# Patient Record
Sex: FEMALE | Race: BLACK OR AFRICAN AMERICAN | NOT HISPANIC OR LATINO
[De-identification: names, ages, dates, MRNs, and addresses within clinical notes are randomized per-mention and may not be internally consistent; named-entity substitution may affect disease eponyms.]

---

## 2017-09-06 PROBLEM — Z00.00 ENCOUNTER FOR PREVENTIVE HEALTH EXAMINATION: Status: ACTIVE | Noted: 2017-09-06

## 2017-09-27 ENCOUNTER — APPOINTMENT (OUTPATIENT)
Dept: VASCULAR SURGERY | Facility: CLINIC | Age: 52
End: 2017-09-27
Payer: MEDICARE

## 2017-09-27 VITALS
DIASTOLIC BLOOD PRESSURE: 72 MMHG | HEIGHT: 67 IN | SYSTOLIC BLOOD PRESSURE: 123 MMHG | BODY MASS INDEX: 39.24 KG/M2 | HEART RATE: 64 BPM | WEIGHT: 250 LBS | OXYGEN SATURATION: 94 %

## 2017-09-27 DIAGNOSIS — Z86.79 PERSONAL HISTORY OF OTHER DISEASES OF THE CIRCULATORY SYSTEM: ICD-10-CM

## 2017-09-27 DIAGNOSIS — Z86.39 PERSONAL HISTORY OF OTHER ENDOCRINE, NUTRITIONAL AND METABOLIC DISEASE: ICD-10-CM

## 2017-09-27 DIAGNOSIS — Z87.448 PERSONAL HISTORY OF OTHER DISEASES OF URINARY SYSTEM: ICD-10-CM

## 2017-09-27 DIAGNOSIS — I77.9 DISORDER OF ARTERIES AND ARTERIOLES, UNSPECIFIED: ICD-10-CM

## 2017-09-27 DIAGNOSIS — E11.40 TYPE 2 DIABETES MELLITUS WITH DIABETIC NEUROPATHY, UNSPECIFIED: ICD-10-CM

## 2017-09-27 PROCEDURE — 93923 UPR/LXTR ART STDY 3+ LVLS: CPT

## 2017-09-27 PROCEDURE — 99204 OFFICE O/P NEW MOD 45 MIN: CPT | Mod: 25

## 2017-09-28 PROBLEM — I77.9 PERIPHERAL ARTERIAL OCCLUSIVE DISEASE: Status: ACTIVE | Noted: 2017-09-28

## 2017-09-28 PROBLEM — Z86.79 HISTORY OF ESSENTIAL HYPERTENSION: Status: RESOLVED | Noted: 2017-09-27 | Resolved: 2017-09-28

## 2017-09-28 PROBLEM — Z87.448 HISTORY OF KIDNEY DISEASE: Status: RESOLVED | Noted: 2017-09-27 | Resolved: 2017-09-28

## 2017-09-28 PROBLEM — Z86.39 HISTORY OF DIABETES MELLITUS: Status: RESOLVED | Noted: 2017-09-27 | Resolved: 2017-09-28

## 2017-09-28 PROBLEM — Z86.39 HISTORY OF HYPERLIPIDEMIA: Status: RESOLVED | Noted: 2017-09-27 | Resolved: 2017-09-28

## 2017-09-28 PROBLEM — E11.40 DIABETIC NEUROPATHY: Status: ACTIVE | Noted: 2017-09-28

## 2017-09-28 RX ORDER — CHOLECALCIFEROL (VITAMIN D3) 50 MCG
50 MCG TABLET ORAL
Refills: 0 | Status: ACTIVE | COMMUNITY

## 2017-09-28 RX ORDER — ASPIRIN 81 MG
81 TABLET,CHEWABLE ORAL
Refills: 0 | Status: ACTIVE | COMMUNITY

## 2017-09-28 RX ORDER — OXYCODONE 10 MG/1
10 TABLET ORAL
Refills: 0 | Status: ACTIVE | COMMUNITY

## 2017-09-28 RX ORDER — LOSARTAN POTASSIUM 50 MG/1
50 TABLET, FILM COATED ORAL
Refills: 0 | Status: ACTIVE | COMMUNITY

## 2017-09-28 RX ORDER — SPIRONOLACTONE 50 MG/1
50 TABLET ORAL
Refills: 0 | Status: ACTIVE | COMMUNITY

## 2017-09-28 RX ORDER — ATORVASTATIN CALCIUM 40 MG/1
40 TABLET, FILM COATED ORAL
Refills: 0 | Status: ACTIVE | COMMUNITY

## 2017-09-28 RX ORDER — METOPROLOL SUCCINATE 50 MG/1
50 TABLET, EXTENDED RELEASE ORAL
Refills: 0 | Status: ACTIVE | COMMUNITY

## 2017-09-28 RX ORDER — GABAPENTIN 400 MG/1
CAPSULE ORAL
Refills: 0 | Status: ACTIVE | COMMUNITY

## 2019-08-22 ENCOUNTER — OUTPATIENT (OUTPATIENT)
Dept: OUTPATIENT SERVICES | Facility: HOSPITAL | Age: 54
LOS: 1 days | Discharge: HOME | End: 2019-08-22

## 2019-08-22 ENCOUNTER — RESULT REVIEW (OUTPATIENT)
Age: 54
End: 2019-08-22

## 2019-08-22 LAB — GLUCOSE BLDC GLUCOMTR-MCNC: 161 MG/DL — HIGH (ref 70–99)

## 2019-08-28 LAB — SURGICAL PATHOLOGY STUDY: SIGNIFICANT CHANGE UP

## 2019-09-05 ENCOUNTER — APPOINTMENT (OUTPATIENT)
Dept: ORTHOPEDIC SURGERY | Facility: CLINIC | Age: 54
End: 2019-09-05
Payer: MEDICARE

## 2019-09-05 VITALS — WEIGHT: 250 LBS | HEIGHT: 67 IN | BODY MASS INDEX: 39.24 KG/M2

## 2019-09-05 DIAGNOSIS — M25.652 STIFFNESS OF RIGHT HIP, NOT ELSEWHERE CLASSIFIED: ICD-10-CM

## 2019-09-05 DIAGNOSIS — M25.651 STIFFNESS OF RIGHT HIP, NOT ELSEWHERE CLASSIFIED: ICD-10-CM

## 2019-09-05 PROCEDURE — 73521 X-RAY EXAM HIPS BI 2 VIEWS: CPT

## 2019-09-05 PROCEDURE — 99204 OFFICE O/P NEW MOD 45 MIN: CPT

## 2019-09-05 NOTE — PHYSICAL EXAM
[de-identified] : His exam today patient's full passive internal and external rotation of both hips with no pain. She has point tenderness in the area of the greater trochanter of both hips.\par \par She also has moderate discomfort with palpation of the lumbar segments paraspinal musculature. [de-identified] : Low AP pelvis and laterals of both hips were obtained showing well-positioned bilateral hip replacements. No obvious evidence of loosening subsidence fracture lysis or wear.

## 2019-09-05 NOTE — HISTORY OF PRESENT ILLNESS
[de-identified] : CHIARA HIP PAIN - SEEN LAST IN 2017 - WOULD LIKE TO DISCUSS SURGERYWas told by another physician that her left hip replacement needs a revision her main complaint is bilateral pain in her hips in the area of the greater trochanter.

## 2019-09-05 NOTE — DISCUSSION/SUMMARY
[de-identified] : Patient's exam and history were consistent with lumbar radiculopathy and stenosis. I will place her on a Medrol dose pack patient is aware she should check her glucose levels at home and adjusted insulin accordingly. She will followup in a week not thoroughly improved for further evaluation

## 2019-09-11 ENCOUNTER — APPOINTMENT (OUTPATIENT)
Dept: ORTHOPEDIC SURGERY | Facility: CLINIC | Age: 54
End: 2019-09-11

## 2019-10-01 ENCOUNTER — APPOINTMENT (OUTPATIENT)
Dept: ORTHOPEDIC SURGERY | Facility: CLINIC | Age: 54
End: 2019-10-01
Payer: MEDICARE

## 2019-10-01 DIAGNOSIS — M54.5 LOW BACK PAIN: ICD-10-CM

## 2019-10-01 PROCEDURE — 99213 OFFICE O/P EST LOW 20 MIN: CPT

## 2019-10-01 PROCEDURE — 72100 X-RAY EXAM L-S SPINE 2/3 VWS: CPT

## 2019-10-01 NOTE — ASSESSMENT
[FreeTextEntry1] : 53 year old female with chronic low back pain.  She recently had an epidural steroid injection which did not provide any relief.  We reviewed her images including her radiographs and MRI. She has lumbar spondylosis, disc disease and L4-L5, L5-S1 disc bulges which could explain her symptoms.  She has not tried physical therapy.  We will provide her with a physical therapy referral.  We also counseled her on weight loss which can also help with her low back pain.  Based on the exam and imaging her pain is not coming from her hips.  She will followup with Dr. Chan for evaluation for another injection which could provide us with diagnostic information as well as be therapeutic for the patient.  She can followup after she has seen Dr. Chan again.  She knows she can call with any questions or concerns or if her symptoms acutely worsen.

## 2019-10-01 NOTE — HISTORY OF PRESENT ILLNESS
[de-identified] : 53 year old female with chronic low back pain since 2009.  She recently had an epidural steroid injection by Dr. Chan which has not provided her any relief.  She describes the pain as being primarily in her low back above her belt line.  She says can sometimes feel it going into her legs but the leg pain isn't what bothers her.  It is the low back pain that limits her activity and causes her the discomfort.  She denies any groin pain.  She has peripheral neuropathy at baseline from her insulin dependent diabetes.  She denies any weakness, any urinary retention or balance problems.

## 2019-10-01 NOTE — HISTORY OF PRESENT ILLNESS
[de-identified] : 53 year old female with chronic low back pain since 2009.  She recently had an epidural steroid injection by Dr. Chan which has not provided her any relief.  She describes the pain as being primarily in her low back above her belt line.  She says can sometimes feel it going into her legs but the leg pain isn't what bothers her.  It is the low back pain that limits her activity and causes her the discomfort.  She denies any groin pain.  She has peripheral neuropathy at baseline from her insulin dependent diabetes.  She denies any weakness, any urinary retention or balance problems.

## 2019-10-01 NOTE — PHYSICAL EXAM
[de-identified] : Lumbar spine:\par Alignment normal.\par Tender to palpation in low back. No step-off, no deformity.\par No neurologic symptoms with active range of motion (flexion, extension, lateral bending and rotation).  Range of motion causes low back discomfort.  \par Negative straight leg raises bilterally\par \par Sensation \par Left L2  -  2/2            \par Left L3  -  1/2\par Left L4  -  1/2\par Left L5  -  1/2\par Left S1  -  1/2\par \par Right L2  -  2/2            \par Right L3  -  1/2\par Right L4  -  1/2\par Right L5  -  1/2\par Right S1  -  1/2\par *sensory exam consistent with patient's known peripheral diabetic neuropathy\par Motor: \par Left L2 (hip flexion)                            5/5                \par Left L3 (knee extension)                   5/5                \par Left L4 (ankle dosiflexion)                 5/5                \par Left L5 (long toe extensor)                5/5                \par Left S1 (ankle plantar flexion)           5/5\par \par Right L2 (hip flexion)                            5/5                \par Right L3 (knee extension)                   5/5                \par Right L4 (ankle dosiflexion)                 5/5                \par Right L5 (long toe extensor)                5/5                \par Right S1 (ankle plantar flexion)           5/5\par \par Reflexes: Normal and symmetric\par \par Negative clonus.  Down-going Babinski.  \par \par  [de-identified] : Lumbar radiographs show lumbar spondylosis and degenerative scoliosis. \par \par Lumbar MRI shows degenerative disc disease most significant at L4-L5, L5-S1 with disc bulging.

## 2019-10-01 NOTE — PHYSICAL EXAM
[de-identified] : Lumbar spine:\par Alignment normal.\par Tender to palpation in low back. No step-off, no deformity.\par No neurologic symptoms with active range of motion (flexion, extension, lateral bending and rotation).  Range of motion causes low back discomfort.  \par Negative straight leg raises bilterally\par \par Sensation \par Left L2  -  2/2            \par Left L3  -  1/2\par Left L4  -  1/2\par Left L5  -  1/2\par Left S1  -  1/2\par \par Right L2  -  2/2            \par Right L3  -  1/2\par Right L4  -  1/2\par Right L5  -  1/2\par Right S1  -  1/2\par *sensory exam consistent with patient's known peripheral diabetic neuropathy\par Motor: \par Left L2 (hip flexion)                            5/5                \par Left L3 (knee extension)                   5/5                \par Left L4 (ankle dosiflexion)                 5/5                \par Left L5 (long toe extensor)                5/5                \par Left S1 (ankle plantar flexion)           5/5\par \par Right L2 (hip flexion)                            5/5                \par Right L3 (knee extension)                   5/5                \par Right L4 (ankle dosiflexion)                 5/5                \par Right L5 (long toe extensor)                5/5                \par Right S1 (ankle plantar flexion)           5/5\par \par Reflexes: Normal and symmetric\par \par Negative clonus.  Down-going Babinski.  \par \par  [de-identified] : Lumbar radiographs show lumbar spondylosis and degenerative scoliosis. \par \par Lumbar MRI shows degenerative disc disease most significant at L4-L5, L5-S1 with disc bulging.

## 2019-10-11 ENCOUNTER — APPOINTMENT (OUTPATIENT)
Dept: COLORECTAL SURGERY | Facility: CLINIC | Age: 54
End: 2019-10-11
Payer: MEDICARE

## 2019-10-11 VITALS
WEIGHT: 251.13 LBS | BODY MASS INDEX: 39.42 KG/M2 | TEMPERATURE: 98.6 F | HEIGHT: 67 IN | DIASTOLIC BLOOD PRESSURE: 66 MMHG | SYSTOLIC BLOOD PRESSURE: 157 MMHG | HEART RATE: 64 BPM

## 2019-10-11 PROCEDURE — 99212 OFFICE O/P EST SF 10 MIN: CPT | Mod: 25

## 2019-10-11 PROCEDURE — 46600 DIAGNOSTIC ANOSCOPY SPX: CPT

## 2019-10-11 NOTE — PHYSICAL EXAM
[Normal rectal exam] : exam was normal [Excoriation] : no perianal excoriation [None] : no anal fissures seen [Fistula] : no fistulas [Multiple Sinus Tracts] : no perianal sinus tracts [Wart] : a wart [Ulcer ___ cm] : no ulcers [Pilonidal Cyst] : no pilonidal cysts [Tender, Swollen] : nontender, non-swollen [Thrombosed] : that was not thrombosed [Skin Tags] : there were no residual hemorrhoidal skin tags seen [Stool Sample Taken] : no stool obtained on rectal exam [Normal] : was normal [Gross Blood] : no gross blood [de-identified] : anoscopy"  3 warts seen at or above dentate line on the left side [de-identified] : external exam WNL

## 2019-10-11 NOTE — ASSESSMENT
[FreeTextEntry1] : 3 internal anal warts.  Advised EUA and fulguration and excision of warts\par Office not the best setting for this procedure.\par Risk of recurrence discussed

## 2019-10-11 NOTE — HISTORY OF PRESENT ILLNESS
[FreeTextEntry1] : Hx anal warts\par For interval exam\par \par S/P recent GYN cone biopsy/leap procedure in August 2019.  Doing ok now from that.\par \par

## 2019-11-04 VITALS
HEART RATE: 63 BPM | RESPIRATION RATE: 16 BRPM | OXYGEN SATURATION: 96 % | DIASTOLIC BLOOD PRESSURE: 71 MMHG | TEMPERATURE: 98 F | SYSTOLIC BLOOD PRESSURE: 147 MMHG | HEIGHT: 67 IN | WEIGHT: 249.12 LBS

## 2019-11-04 NOTE — ASU PATIENT PROFILE, ADULT - PMH
Anal warts    DM (diabetes mellitus)    HLD (hyperlipidemia)    HPV in female    HTN (hypertension) Anal warts    DM (diabetes mellitus)    HLD (hyperlipidemia)    HPV in female    HTN (hypertension)    Sarcoidosis  Lung and Liver Anal warts    DM (diabetes mellitus)    HLD (hyperlipidemia)    HPV in female    HTN (hypertension)    Liver cirrhosis    Sarcoidosis  Lung and Liver

## 2019-11-04 NOTE — ASU PATIENT PROFILE, ADULT - PRESSURE ULCER(S)
no
Started on IV potassium chloride 10mEq, potassium chloride tab 40 mEq  Monitor serum K+  Repeat BMP, Mg and Phos in progress

## 2019-11-04 NOTE — ASU PATIENT PROFILE, ADULT - CENTRAL VENOUS CATHETER
Patient updated and verbalized her understanding.     Lizzy will need to call May 1 to set-up a CT Urogram to monitor a left kidney mass.     no

## 2019-11-04 NOTE — ASU PATIENT PROFILE, ADULT - PSH
History of     History of cholecystectomy    History of foot surgery    History of hand surgery    History of hernia repair    History of hip replacement    History of shoulder surgery

## 2019-11-04 NOTE — ASU PREOP CHECKLIST - SELECT TESTS ORDERED
PT/PTT/Urinalysis/EKG/CBC/INR/Stress Test/CMP EKG/Urinalysis/Stress Test,echo/CMP/PT/PTT/CBC/INR PT/PTT/Urinalysis/Stress Test,echo, ucg - negative/CMP/INR/CBC/EKG

## 2019-11-05 ENCOUNTER — APPOINTMENT (OUTPATIENT)
Dept: COLORECTAL SURGERY | Facility: HOSPITAL | Age: 54
End: 2019-11-05

## 2019-11-12 ENCOUNTER — APPOINTMENT (OUTPATIENT)
Dept: COLORECTAL SURGERY | Facility: HOSPITAL | Age: 54
End: 2019-11-12

## 2019-11-12 ENCOUNTER — OUTPATIENT (OUTPATIENT)
Dept: OUTPATIENT SERVICES | Facility: HOSPITAL | Age: 54
LOS: 1 days | Discharge: HOME | End: 2019-11-12
Payer: MEDICARE

## 2019-11-12 ENCOUNTER — RESULT REVIEW (OUTPATIENT)
Age: 54
End: 2019-11-12

## 2019-11-12 VITALS
RESPIRATION RATE: 12 BRPM | OXYGEN SATURATION: 96 % | DIASTOLIC BLOOD PRESSURE: 79 MMHG | HEART RATE: 60 BPM | SYSTOLIC BLOOD PRESSURE: 161 MMHG

## 2019-11-12 DIAGNOSIS — Z96.649 PRESENCE OF UNSPECIFIED ARTIFICIAL HIP JOINT: Chronic | ICD-10-CM

## 2019-11-12 DIAGNOSIS — Z90.49 ACQUIRED ABSENCE OF OTHER SPECIFIED PARTS OF DIGESTIVE TRACT: Chronic | ICD-10-CM

## 2019-11-12 DIAGNOSIS — Z98.890 OTHER SPECIFIED POSTPROCEDURAL STATES: Chronic | ICD-10-CM

## 2019-11-12 DIAGNOSIS — Z98.891 HISTORY OF UTERINE SCAR FROM PREVIOUS SURGERY: Chronic | ICD-10-CM

## 2019-11-12 LAB
GLUCOSE BLDC GLUCOMTR-MCNC: 145 MG/DL — HIGH (ref 70–99)
GLUCOSE BLDC GLUCOMTR-MCNC: 159 MG/DL — HIGH (ref 70–99)

## 2019-11-12 PROCEDURE — 46922 EXCISION OF ANAL LESION(S): CPT

## 2019-11-12 PROCEDURE — 82962 GLUCOSE BLOOD TEST: CPT

## 2019-11-12 PROCEDURE — 88305 TISSUE EXAM BY PATHOLOGIST: CPT

## 2019-11-12 RX ORDER — KETOROLAC TROMETHAMINE 30 MG/ML
1 SYRINGE (ML) INJECTION
Qty: 12 | Refills: 0
Start: 2019-11-12 | End: 2019-11-14

## 2019-11-12 RX ORDER — ONDANSETRON 8 MG/1
4 TABLET, FILM COATED ORAL ONCE
Refills: 0 | Status: DISCONTINUED | OUTPATIENT
Start: 2019-11-12 | End: 2019-11-12

## 2019-11-12 RX ORDER — SODIUM CHLORIDE 9 MG/ML
1000 INJECTION, SOLUTION INTRAVENOUS
Refills: 0 | Status: DISCONTINUED | OUTPATIENT
Start: 2019-11-12 | End: 2019-11-12

## 2019-11-12 RX ORDER — BUPIVACAINE 13.3 MG/ML
20 INJECTION, SUSPENSION, LIPOSOMAL INFILTRATION ONCE
Refills: 0 | Status: DISCONTINUED | OUTPATIENT
Start: 2019-11-12 | End: 2019-11-12

## 2019-11-12 RX ORDER — KETOROLAC TROMETHAMINE 30 MG/ML
10 SYRINGE (ML) INJECTION ONCE
Refills: 0 | Status: DISCONTINUED | OUTPATIENT
Start: 2019-11-12 | End: 2019-11-12

## 2019-11-12 RX ADMIN — SODIUM CHLORIDE 60 MILLILITER(S): 9 INJECTION, SOLUTION INTRAVENOUS at 09:45

## 2019-11-12 NOTE — BRIEF OPERATIVE NOTE - NSICDXBRIEFPROCEDURE_GEN_ALL_CORE_FT
PROCEDURES:  Exam under anesthesia, anus 12-Nov-2019 09:28:47  Roxanne Mittal  Excision, anal condyloma 12-Nov-2019 09:28:35  Roxanne Mittal

## 2019-11-12 NOTE — PACU DISCHARGE NOTE - COMMENTS
Patient meets criteria for discharge to [facility]. VSS.Denies pain at this time. Operative site with no drainage at this time. All instructions reviewed and  of providers information given to patient and family. IV discontinued. Tolerated po diet. voided. Discharged to [facility] via wheelchair with PCA.

## 2019-11-12 NOTE — BRIEF OPERATIVE NOTE - OPERATION/FINDINGS
Exam revealed anal condylomas x2. Both excised and edges reapproximated with chromic sutures. Continued exam revealed no further lesions. Hemostasis achieved prior to end of procedure.

## 2019-11-13 LAB — SURGICAL PATHOLOGY STUDY: SIGNIFICANT CHANGE UP

## 2019-11-22 PROBLEM — A63.0 ANOGENITAL (VENEREAL) WARTS: Chronic | Status: ACTIVE | Noted: 2019-11-04

## 2019-11-22 PROBLEM — B97.7 PAPILLOMAVIRUS AS THE CAUSE OF DISEASES CLASSIFIED ELSEWHERE: Chronic | Status: ACTIVE | Noted: 2019-11-04

## 2019-11-22 PROBLEM — E78.5 HYPERLIPIDEMIA, UNSPECIFIED: Chronic | Status: ACTIVE | Noted: 2019-11-04

## 2019-11-22 PROBLEM — E11.9 TYPE 2 DIABETES MELLITUS WITHOUT COMPLICATIONS: Chronic | Status: ACTIVE | Noted: 2019-11-04

## 2019-11-22 PROBLEM — D86.9 SARCOIDOSIS, UNSPECIFIED: Chronic | Status: ACTIVE | Noted: 2019-11-12

## 2019-11-22 PROBLEM — K74.60 UNSPECIFIED CIRRHOSIS OF LIVER: Chronic | Status: ACTIVE | Noted: 2019-11-12

## 2019-11-22 PROBLEM — I10 ESSENTIAL (PRIMARY) HYPERTENSION: Chronic | Status: ACTIVE | Noted: 2019-11-04

## 2019-12-05 ENCOUNTER — APPOINTMENT (OUTPATIENT)
Dept: COLORECTAL SURGERY | Facility: CLINIC | Age: 54
End: 2019-12-05
Payer: MEDICARE

## 2019-12-05 VITALS
DIASTOLIC BLOOD PRESSURE: 65 MMHG | SYSTOLIC BLOOD PRESSURE: 149 MMHG | TEMPERATURE: 97.8 F | BODY MASS INDEX: 39.39 KG/M2 | HEART RATE: 69 BPM | WEIGHT: 251 LBS | HEIGHT: 67 IN

## 2019-12-05 PROCEDURE — 99212 OFFICE O/P EST SF 10 MIN: CPT

## 2019-12-05 NOTE — HISTORY OF PRESENT ILLNESS
[FreeTextEntry1] : Patient s/p exciaion of anal skin tag and anal condyloma on 11/12/2019.  No complaints, no pain, no bleeding.

## 2019-12-05 NOTE — PHYSICAL EXAM
[FreeTextEntry1] : PE: \par \par AOA, looks well\par external anal: no open wounds, healed, no warts. \par \par

## 2019-12-05 NOTE — ASSESSMENT
[FreeTextEntry1] : s/p excision of anal condyloma, \par Path reviewed: no dysplasia, \par follow up in 2 months.

## 2020-02-10 ENCOUNTER — APPOINTMENT (OUTPATIENT)
Dept: COLORECTAL SURGERY | Facility: CLINIC | Age: 55
End: 2020-02-10
Payer: MEDICARE

## 2020-02-10 VITALS
DIASTOLIC BLOOD PRESSURE: 69 MMHG | WEIGHT: 255 LBS | BODY MASS INDEX: 40.02 KG/M2 | HEIGHT: 67 IN | SYSTOLIC BLOOD PRESSURE: 164 MMHG | TEMPERATURE: 97.6 F | HEART RATE: 69 BPM

## 2020-02-10 PROCEDURE — 46600 DIAGNOSTIC ANOSCOPY SPX: CPT

## 2020-02-10 PROCEDURE — 99214 OFFICE O/P EST MOD 30 MIN: CPT | Mod: 25

## 2020-02-10 NOTE — ASSESSMENT
[FreeTextEntry1] : Two internal lesions seen (both mucosal). Not amenable to acid treatment.\par Advise EUA and excision and fulguration. \par Needs medical clearance \par Ambulatory procedure.\par Can continue her ASA daily (not on blood thinner, or other anticoagulants)

## 2020-02-10 NOTE — HISTORY OF PRESENT ILLNESS
[FreeTextEntry1] : Hx anal warts and AIN.\par Hx of cirrhosis related to her sarcoidosis.\par No anorectal symptoms.\par Overall she has been stable.

## 2020-02-10 NOTE — PHYSICAL EXAM
[Abdomen Masses] : No abdominal masses [None] : no anal fissures seen [Excoriation] : no perianal excoriation [Abdomen Tenderness] : ~T No ~M abdominal tenderness [Fistula] : no fistulas [Multiple Sinus Tracts] : no perianal sinus tracts [Wart] : a wart [Tender, Swollen] : nontender, non-swollen [Pilonidal Cyst] : no pilonidal cysts [Nonprolapsing] : a nonprolapsing (grade I) [Gross Blood] : no gross blood [Normal] : was normal [JVD] : no jugular venous distention  [Carotid Bruits] : no carotid bruits [Thyroid] : the thyroid was abnormal [Normal Breath Sounds] : Normal breath sounds [Normal Heart Sounds] : normal heart sounds [2+] : left 2+ [No Rash or Lesion] : No rash or lesion [Alert] : alert [Oriented to Person] : oriented to person [Oriented to Place] : oriented to place [Oriented to Time] : oriented to time [Calm] : calm [de-identified] : obese, healed scars, no masses, no tenderness [de-identified] : external: no lesions seen, has skin tags [de-identified] : internaol warts on mucosa: 1 posterior and 1 anterior. fairly clear [de-identified] : digital: tone poor, no lesions palpated, empty vault [de-identified] : NAD

## 2020-03-27 ENCOUNTER — APPOINTMENT (OUTPATIENT)
Dept: ORTHOPEDIC SURGERY | Facility: CLINIC | Age: 55
End: 2020-03-27

## 2020-04-02 ENCOUNTER — APPOINTMENT (OUTPATIENT)
Dept: COLORECTAL SURGERY | Facility: HOSPITAL | Age: 55
End: 2020-04-02

## 2020-04-15 ENCOUNTER — APPOINTMENT (OUTPATIENT)
Dept: PHYSICAL MEDICINE AND REHAB | Facility: CLINIC | Age: 55
End: 2020-04-15

## 2020-07-13 ENCOUNTER — APPOINTMENT (OUTPATIENT)
Dept: ORTHOPEDIC SURGERY | Facility: CLINIC | Age: 55
End: 2020-07-13
Payer: MEDICARE

## 2020-07-13 PROCEDURE — 99214 OFFICE O/P EST MOD 30 MIN: CPT

## 2020-07-13 PROCEDURE — 73521 X-RAY EXAM HIPS BI 2 VIEWS: CPT

## 2020-07-13 PROCEDURE — 73562 X-RAY EXAM OF KNEE 3: CPT | Mod: 50

## 2020-07-13 NOTE — REASON FOR VISIT
[Follow-Up Visit] : a follow-up visit for [FreeTextEntry2] : CHIARA KNEE AND CHIARA HIP PAIN - SENT FOR XRAYS OF BOTH AREAS

## 2020-07-13 NOTE — DISCUSSION/SUMMARY
[de-identified] : We discussed possible cortisone injection into the right knee and she deferred today because she is having hand surgery in 2 days and does not lock or surgery patient return next Monday if still having persistent pain to the injection.

## 2020-07-13 NOTE — PHYSICAL EXAM
[de-identified] : Patient's right knee has minimal warmth there is definite mild medial joint line tenderness positive Ana sign. As far as the hips are concerned patient has both hips exhibit full passive internal/external rotation without restriction. [de-identified] : AP standing of the laterals as well as sunrise views of both obtained showing modest diminishment of medial joint space on standing AP projection of both knees. AP lateral both hips were obtained showing acceptable position of bilateral hips. No evidence of subsidence loosening or wear her prior radiographs.

## 2020-07-13 NOTE — HISTORY OF PRESENT ILLNESS
[de-identified] : Patient share status post bilateral hip replacements for surveillance check and also the plate of right knee pain. Patient states that she has difficulty with stair climbing getting in a seated position pain is medially based. She is admitted with a cane due to discomfort.

## 2020-07-30 ENCOUNTER — APPOINTMENT (OUTPATIENT)
Dept: ORTHOPEDIC SURGERY | Facility: CLINIC | Age: 55
End: 2020-07-30

## 2020-08-11 ENCOUNTER — LABORATORY RESULT (OUTPATIENT)
Age: 55
End: 2020-08-11

## 2020-08-12 ENCOUNTER — TRANSCRIPTION ENCOUNTER (OUTPATIENT)
Age: 55
End: 2020-08-12

## 2020-08-13 ENCOUNTER — OUTPATIENT (OUTPATIENT)
Dept: OUTPATIENT SERVICES | Facility: HOSPITAL | Age: 55
LOS: 1 days | Discharge: HOME | End: 2020-08-13
Payer: MEDICARE

## 2020-08-13 ENCOUNTER — APPOINTMENT (OUTPATIENT)
Dept: COLORECTAL SURGERY | Facility: AMBULATORY SURGERY CENTER | Age: 55
End: 2020-08-13

## 2020-08-13 DIAGNOSIS — Z90.49 ACQUIRED ABSENCE OF OTHER SPECIFIED PARTS OF DIGESTIVE TRACT: Chronic | ICD-10-CM

## 2020-08-13 DIAGNOSIS — Z98.890 OTHER SPECIFIED POSTPROCEDURAL STATES: Chronic | ICD-10-CM

## 2020-08-13 DIAGNOSIS — Z98.891 HISTORY OF UTERINE SCAR FROM PREVIOUS SURGERY: Chronic | ICD-10-CM

## 2020-08-13 DIAGNOSIS — Z96.649 PRESENCE OF UNSPECIFIED ARTIFICIAL HIP JOINT: Chronic | ICD-10-CM

## 2020-08-13 LAB
GLUCOSE BLDC GLUCOMTR-MCNC: 182 MG/DL — HIGH (ref 70–99)
GLUCOSE BLDC GLUCOMTR-MCNC: 186 MG/DL — HIGH (ref 70–99)

## 2020-08-13 PROCEDURE — 46910 DESTRUCTION ANAL LESION(S): CPT

## 2020-08-13 PROCEDURE — 46922 EXCISION OF ANAL LESION(S): CPT

## 2020-08-13 RX ORDER — OXYCODONE HYDROCHLORIDE 5 MG/1
1 TABLET ORAL
Qty: 20 | Refills: 0
Start: 2020-08-13

## 2020-08-13 RX ORDER — ACETAMINOPHEN 500 MG
1 TABLET ORAL
Qty: 40 | Refills: 0
Start: 2020-08-13 | End: 2020-08-19

## 2020-08-13 RX ORDER — DOCUSATE SODIUM 100 MG
1 CAPSULE ORAL
Qty: 15 | Refills: 0
Start: 2020-08-13 | End: 2020-08-27

## 2020-08-13 RX ORDER — POLYETHYLENE GLYCOL 3350 17 G/17G
1 POWDER, FOR SOLUTION ORAL
Qty: 15 | Refills: 0
Start: 2020-08-13 | End: 2020-08-27

## 2020-08-18 ENCOUNTER — RESULT REVIEW (OUTPATIENT)
Age: 55
End: 2020-08-18

## 2020-08-18 PROCEDURE — 88305 TISSUE EXAM BY PATHOLOGIST: CPT | Mod: 26

## 2020-08-19 LAB — SURGICAL PATHOLOGY STUDY: SIGNIFICANT CHANGE UP

## 2020-08-24 ENCOUNTER — APPOINTMENT (OUTPATIENT)
Dept: COLORECTAL SURGERY | Facility: CLINIC | Age: 55
End: 2020-08-24
Payer: MEDICARE

## 2020-08-24 VITALS
DIASTOLIC BLOOD PRESSURE: 65 MMHG | WEIGHT: 245.63 LBS | TEMPERATURE: 97.2 F | HEART RATE: 54 BPM | SYSTOLIC BLOOD PRESSURE: 108 MMHG | HEIGHT: 67 IN | BODY MASS INDEX: 38.55 KG/M2 | OXYGEN SATURATION: 95 %

## 2020-08-24 PROCEDURE — 99213 OFFICE O/P EST LOW 20 MIN: CPT

## 2020-08-24 NOTE — HISTORY OF PRESENT ILLNESS
[FreeTextEntry1] : Patient with history anal condyloma is s/p re excision on August 13, 2020. \par Doing well, no complaints of pain,   just "burning" which is getting better. \par no bleeding. she is having bowel movements  and does sitz baths daily and after bms.  she is voiding normally.   She wants to know how to prevent them from coming back.

## 2020-08-24 NOTE — ASSESSMENT
[FreeTextEntry1] : S./p excision of anal condyloma.    may use  topical such as A & D ointment or desitin for comfort. \par discussed that there is no "cure for condyloma to stop them from coming back .  we discussed  some homeopathic alternatives, which may help, but won't hurt.  Gave her information regarding Thuja occidentalis, 6 C or 30C to take twice daily until her next visit.

## 2020-08-24 NOTE — PHYSICAL EXAM
[FreeTextEntry1] : PE: \par \par AOA, looks well \par abdomen soft, non tender,\par external anal:  2cm open partial thickness would posteriorly,  smaller partial thickness wound laterally. \par both are clean and granulating.

## 2020-12-11 ENCOUNTER — APPOINTMENT (OUTPATIENT)
Dept: COLORECTAL SURGERY | Facility: CLINIC | Age: 55
End: 2020-12-11
Payer: MEDICARE

## 2020-12-11 VITALS
SYSTOLIC BLOOD PRESSURE: 149 MMHG | DIASTOLIC BLOOD PRESSURE: 75 MMHG | TEMPERATURE: 97.2 F | HEIGHT: 67 IN | BODY MASS INDEX: 38.85 KG/M2 | WEIGHT: 247.56 LBS | HEART RATE: 60 BPM | OXYGEN SATURATION: 96 %

## 2020-12-11 PROCEDURE — 99072 ADDL SUPL MATRL&STAF TM PHE: CPT

## 2020-12-11 PROCEDURE — 46600 DIAGNOSTIC ANOSCOPY SPX: CPT

## 2020-12-11 PROCEDURE — 99212 OFFICE O/P EST SF 10 MIN: CPT | Mod: 25

## 2020-12-11 NOTE — PHYSICAL EXAM
[Nonprolapsing] : a nonprolapsing (grade I) [Normal] : was normal [None] : there was no rectal mass  [Tender, Swollen] : nontender, non-swollen [Thrombosed] : that was not thrombosed [Skin Tags] : there were no residual hemorrhoidal skin tags seen [Stool Sample Taken] : no stool obtained on rectal exam [Gross Blood] : no gross blood [de-identified] : external: WNL, no lesions seen no fistula, fissure [de-identified] : digital: tone WNL, no masses, no impaction [de-identified] : anoscopy: 3 passes, adult scope, no lesions seen, grade 1 hemorrhoids, no fissure

## 2021-07-19 ENCOUNTER — APPOINTMENT (OUTPATIENT)
Dept: COLORECTAL SURGERY | Facility: CLINIC | Age: 56
End: 2021-07-19
Payer: MEDICARE

## 2021-07-19 VITALS
DIASTOLIC BLOOD PRESSURE: 65 MMHG | OXYGEN SATURATION: 98 % | HEIGHT: 67 IN | TEMPERATURE: 97.6 F | SYSTOLIC BLOOD PRESSURE: 129 MMHG | BODY MASS INDEX: 37.39 KG/M2 | HEART RATE: 55 BPM | WEIGHT: 238.25 LBS

## 2021-07-19 VITALS — TEMPERATURE: 97.4 F

## 2021-07-19 PROCEDURE — 46600 DIAGNOSTIC ANOSCOPY SPX: CPT

## 2021-07-19 PROCEDURE — 99072 ADDL SUPL MATRL&STAF TM PHE: CPT

## 2021-07-19 NOTE — HISTORY OF PRESENT ILLNESS
[FreeTextEntry1] : No rectal symptoms.\par No bleeding.  No pain.\par \par \par Had LE vascular issue that required angioplasty by Dr. Jesus Dickerson recently\par Had breast lesion excision that was complicated by abscess that was drained and treated as inpatient. \par \par \par

## 2021-07-19 NOTE — PHYSICAL EXAM
[Skin Tags] : residual hemorrhoidal skin tags were noted [None] : there was no rectal mass  [Normal Breath Sounds] : Normal breath sounds [Normal Heart Sounds] : normal heart sounds [2+] : left 2+ [No Rash or Lesion] : No rash or lesion [Alert] : alert [Oriented to Person] : oriented to person [Oriented to Place] : oriented to place [Oriented to Time] : oriented to time [Calm] : calm [Excoriation] : no perianal excoriation [Fistula] : no fistulas [Wart] : no warts [Ulcer ___ cm] : no ulcers [Tender, Swollen] : nontender, non-swollen [Thrombosed] : that was not thrombosed [Stool Sample Taken] : no stool obtained on rectal exam [Gross Blood] : no gross blood [JVD] : no jugular venous distention  [Thyroid] : the thyroid was abnormal [Carotid Bruits] : no carotid bruits [de-identified] : ext: no lesions seen.  Digital: no masses, no lesions palpated.  Anoscopy: 3-4 passes with adult scope: no warts seen, has some RMP and hemorrhoids grade 1-2.  otherwise wnl [de-identified] : NAD

## 2022-02-07 ENCOUNTER — APPOINTMENT (OUTPATIENT)
Dept: COLORECTAL SURGERY | Facility: CLINIC | Age: 57
End: 2022-02-07

## 2022-02-11 ENCOUNTER — APPOINTMENT (OUTPATIENT)
Dept: COLORECTAL SURGERY | Facility: CLINIC | Age: 57
End: 2022-02-11
Payer: COMMERCIAL

## 2022-02-11 VITALS
BODY MASS INDEX: 37.35 KG/M2 | OXYGEN SATURATION: 97 % | TEMPERATURE: 96.2 F | DIASTOLIC BLOOD PRESSURE: 56 MMHG | SYSTOLIC BLOOD PRESSURE: 122 MMHG | WEIGHT: 238 LBS | HEIGHT: 67 IN | HEART RATE: 68 BPM

## 2022-02-11 PROCEDURE — 46600 DIAGNOSTIC ANOSCOPY SPX: CPT

## 2022-02-11 RX ORDER — APIXABAN 5 MG/1
5 TABLET, FILM COATED ORAL
Refills: 0 | Status: ACTIVE | COMMUNITY

## 2022-02-11 RX ORDER — INSULIN HUMAN 100 [IU]/ML
INJECTION, SUSPENSION SUBCUTANEOUS
Refills: 0 | Status: COMPLETED | COMMUNITY
End: 2022-02-11

## 2022-02-11 RX ORDER — IBUPROFEN 800 MG
TABLET ORAL
Refills: 0 | Status: COMPLETED | COMMUNITY
End: 2022-02-11

## 2022-02-11 RX ORDER — METHYLPREDNISOLONE 4 MG/1
4 TABLET ORAL
Qty: 1 | Refills: 1 | Status: COMPLETED | COMMUNITY
Start: 2019-09-05 | End: 2022-02-11

## 2022-02-11 RX ORDER — CALCIUM CARBONATE/VITAMIN D3 600 MG-10
TABLET ORAL
Refills: 0 | Status: ACTIVE | COMMUNITY

## 2022-02-11 RX ORDER — INSULIN DEGLUDEC INJECTION 100 U/ML
INJECTION, SOLUTION SUBCUTANEOUS
Refills: 0 | Status: ACTIVE | COMMUNITY

## 2022-02-11 RX ORDER — INSULIN HUMAN 500 [IU]/ML
500 INJECTION, SOLUTION SUBCUTANEOUS
Refills: 0 | Status: ACTIVE | COMMUNITY

## 2022-02-11 NOTE — HISTORY OF PRESENT ILLNESS
[FreeTextEntry1] : 56 year old female with history of anal condyloma.  \par REcently hospitalized in [facility] for left DVT . \par \par No rectal pain, occasional bleeding when wipes.

## 2022-02-11 NOTE — PHYSICAL EXAM
[Excoriation] : no perianal excoriation [Fistula] : no fistulas [Wart] : no warts [Ulcer ___ cm] : no ulcers [Tender, Swollen] : nontender, non-swollen [Thrombosed] : that was not thrombosed [Skin Tags] : residual hemorrhoidal skin tags were noted [Normal] : was normal [None] : there was no rectal mass  [Stool Sample Taken] : no stool obtained on rectal exam [Gross Blood] : no gross blood [de-identified] : ext: no lesions.  digital: no lesions, tone WNL:   anoscopy: adult, three passes. no lesions seen

## 2022-02-11 NOTE — REVIEW OF SYSTEMS
[Loss Of Hearing] : hearing loss [Negative] : Heme/Lymph [Fever] : no fever [Chills] : no chills [Feeling Poorly] : not feeling poorly [Feeling Tired] : not feeling tired [Recent Weight Gain (___ Lbs)] : no recent weight gain [Recent Weight Loss (___ Lbs)] : no recent weight loss [Earache] : no earache [Nosebleeds] : no nosebleeds [Nasal Discharge] : no nasal discharge [Heart Rate Is Slow] : the heart rate was not slow [Heart Rate Is Fast] : the heart rate was not fast [Chest Pain] : no chest pain [Palpitations] : no palpitations [Leg Claudication] : no intermittent leg claudication [Shortness Of Breath] : no shortness of breath [Wheezing] : no wheezing [Cough] : no cough [SOB on Exertion] : no shortness of breath during exertion [Orthopnea] : no orthopnea [Skin Lesions] : no skin lesions [Skin Wound] : no skin wound [Itching] : no itching [Change In A Mole] : no change in a mole [Breast Pain] : no breast pain [Breast Lump] : no breast lump [Confused] : no confusion [Convulsions] : no convulsions [Dizziness] : no dizziness [Limb Weakness] : no limb weakness [Difficulty Walking] : no difficulty walking [de-identified] : + hx DVT on eliquis now

## 2022-02-11 NOTE — ASSESSMENT
[FreeTextEntry1] : Clean exam (has some hemorrhoids but not inflamed now or bleeding).\par No warts.\par On eliquis.\par RTC 6 months.\par OK from our point of view\par

## 2022-09-12 ENCOUNTER — APPOINTMENT (OUTPATIENT)
Dept: COLORECTAL SURGERY | Facility: CLINIC | Age: 57
End: 2022-09-12

## 2022-09-12 VITALS
WEIGHT: 230 LBS | SYSTOLIC BLOOD PRESSURE: 118 MMHG | RESPIRATION RATE: 16 BRPM | HEIGHT: 67 IN | OXYGEN SATURATION: 98 % | TEMPERATURE: 98.5 F | HEART RATE: 60 BPM | DIASTOLIC BLOOD PRESSURE: 62 MMHG | BODY MASS INDEX: 36.1 KG/M2

## 2022-09-12 PROCEDURE — 99212 OFFICE O/P EST SF 10 MIN: CPT

## 2022-09-12 NOTE — PHYSICAL EXAM
[Normal] : was normal [None] : there was no rectal mass  [Abdomen Masses] : No abdominal masses [Abdomen Tenderness] : ~T No ~M abdominal tenderness [Excoriation] : no perianal excoriation [Fistula] : no fistulas [Wart] : no warts [Ulcer ___ cm] : no ulcers [Tender, Swollen] : nontender, non-swollen [Thrombosed] : that was not thrombosed [Skin Tags] : there were no residual hemorrhoidal skin tags seen [de-identified] : no groin nodes or masses,  abdomen benign, numerous scars [de-identified] : ext: some scarring but no signs of warts or lesions.  digital: no lesions noted, tone WNL.  Anoscopy, 4 passes, adult scope, scarring at the dentate line but no warts seen

## 2022-09-12 NOTE — HISTORY OF PRESENT ILLNESS
[FreeTextEntry1] : Condylomata follow up.\par No symptoms.\par No bleeding or pain.\par \par Last colonoscopy was 2017.  Had hyperplastic polyps then.\par Has  Mom and an Aunt with colon cancer. She is high risk. \par \par On eliquis for DVT\par Had peripheral vascular stents placed at [facility] and Dr. Lawernce Lee.\par \par

## 2022-09-12 NOTE — ASSESSMENT
[FreeTextEntry1] : Negative exam for warts or suspicous lesions today.\par RTC in 4 months for next exam, following R Anterior dentate line region\par \par Should get colonoscopy since she has + fam hx and it has been 5 years.\par Referring back to Dr. AISSATOU Hamilton. \par \par PMD and vascular f/u as well.

## 2023-02-06 ENCOUNTER — APPOINTMENT (OUTPATIENT)
Dept: COLORECTAL SURGERY | Facility: CLINIC | Age: 58
End: 2023-02-06
Payer: MEDICARE

## 2023-02-06 VITALS
SYSTOLIC BLOOD PRESSURE: 103 MMHG | TEMPERATURE: 98.06 F | HEART RATE: 72 BPM | RESPIRATION RATE: 18 BRPM | HEIGHT: 67 IN | OXYGEN SATURATION: 98 % | DIASTOLIC BLOOD PRESSURE: 62 MMHG | BODY MASS INDEX: 37.69 KG/M2 | WEIGHT: 240.13 LBS

## 2023-02-06 PROCEDURE — 99212 OFFICE O/P EST SF 10 MIN: CPT

## 2023-02-06 NOTE — ASSESSMENT
[FreeTextEntry1] : No lesions seen.  Function is regular.   Rare minor BRBPR.\par To get colonoscopy soon by Dr. Hamilton.\par Had recent EGD done.\par F/u 6 months

## 2023-02-06 NOTE — HISTORY OF PRESENT ILLNESS
[FreeTextEntry1] : 58 y/o F with Hx. of Anal condyloma here for routine f/u. Denies itchiness or irritation in perianal area.  Bowel movements are every 3-4 days and soft to hard x 6 months. Needs to strain sometimes. Rarely has blood on tissue. Denies pain with bowel movements. Denies abdominal pain, nausea or vomiting. \par On Eliquis for DVT in LLE since 6/2022. follows up with Dr. Lawrence Lopez at [facility]. \par \par Colonoscopy scheduled with Dr. Hamilton on 2/28/23.

## 2023-02-06 NOTE — REVIEW OF SYSTEMS
[Recent Weight Gain (___ Lbs)] : recent [unfilled] ~Ulb weight gain [Limb Pain] : limb pain [Easy Bleeding] : a tendency for easy bleeding [Easy Bruising] : a tendency for easy bruising [Negative] : Endocrine [Fever] : no fever [Chills] : no chills [Feeling Poorly] : not feeling poorly [Feeling Tired] : not feeling tired [Recent Weight Loss (___ Lbs)] : no recent weight loss [Eye Pain] : no eye pain [Red Eyes] : eyes not red [Eyesight Problems] : no eyesight problems [Discharge From Eyes] : no purulent discharge from the eyes [Dry Eyes] : no dryness of the eyes [Eyes Itch] : no itching of the eyes [Earache] : no earache [Loss Of Hearing] : no hearing loss [Nosebleeds] : no nosebleeds [Nasal Discharge] : no nasal discharge [Sore Throat] : no sore throat [Hoarseness] : no hoarseness [Heart Rate Is Slow] : the heart rate was not slow [Heart Rate Is Fast] : the heart rate was not fast [Chest Pain] : no chest pain [Palpitations] : no palpitations [Leg Claudication] : no intermittent leg claudication [Lower Ext Edema] : no lower extremity edema [Shortness Of Breath] : no shortness of breath [Wheezing] : no wheezing [Cough] : no cough [SOB on Exertion] : no shortness of breath during exertion [Orthopnea] : no orthopnea [PND] : no PND [Abdominal Pain] : no abdominal pain [Vomiting] : no vomiting [Constipation] : no constipation [Diarrhea] : no diarrhea [Heartburn] : no heartburn [Melena] : no melena [Dysuria] : no dysuria [Incontinence] : no incontinence [Pelvic Pain] : no pelvic pain [Dysmenorrhea] : no dysmenorrhea [Vaginal Discharge] : no vaginal discharge [Abn Vaginal Bleeding] : no unexplained vaginal bleeding [Arthralgias] : no arthralgias [Joint Swelling] : no joint swelling [Joint Stiffness] : no joint stiffness [Limb Swelling] : no limb swelling [Confused] : no confusion [Convulsions] : no convulsions [Dizziness] : no dizziness [Fainting] : no fainting [Suicidal] : not suicidal [Anxiety] : no anxiety [Depression] : no depression [FreeTextEntry2] : Gained 10 lbs in 6 months [FreeTextEntry8] : Post menopausal [FreeTextEntry9] : Diabetic neuropathy. Uses cane to walk.  [de-identified] : On Eliquis and baby aspirin

## 2023-02-06 NOTE — PHYSICAL EXAM
[Normal Breath Sounds] : Normal breath sounds [Normal Heart Sounds] : normal heart sounds [Normal Rate and Rhythm] : normal rate and rhythm [2+] : left 2+ [1+] : left 1+ [Alert] : alert [Oriented to Person] : oriented to person [Oriented to Place] : oriented to place [Oriented to Time] : oriented to time [Calm] : calm [Nonprolapsing] : a nonprolapsing (grade I) [Normal] : was normal [None] : there was no rectal mass  [Abdomen Masses] : No abdominal masses [Abdomen Tenderness] : ~T No ~M abdominal tenderness [Excoriation] : no perianal excoriation [Fistula] : no fistulas [Wart] : no warts [Ulcer ___ cm] : no ulcers [Tender, Swollen] : nontender, non-swollen [Thrombosed] : that was not thrombosed [Skin Tags] : there were no residual hemorrhoidal skin tags seen [Stool Sample Taken] : no stool obtained on rectal exam [Gross Blood] : no gross blood [JVD] : no jugular venous distention  [Carotid Bruits] : no carotid bruits [Wheezing] : no wheezing was heard [Right Carotid Bruit] : no bruit heard over the right carotid [Left Carotid Bruit] : no bruit heard over the left carotid [de-identified] : Obese with old scars [de-identified] : ext: no lesions seen.  No large skin tags.  Digital: no masses, WNL tone, no stenosis.  anoscopy: 3 passes, adult scope, no lesions seen. some scarring [de-identified] : NAD

## 2023-05-22 ENCOUNTER — APPOINTMENT (OUTPATIENT)
Dept: ORTHOPEDIC SURGERY | Facility: CLINIC | Age: 58
End: 2023-05-22
Payer: MEDICARE

## 2023-05-22 PROCEDURE — 99214 OFFICE O/P EST MOD 30 MIN: CPT

## 2023-05-22 RX ORDER — METHYLPREDNISOLONE 4 MG/1
4 TABLET ORAL
Qty: 1 | Refills: 3 | Status: ACTIVE | COMMUNITY
Start: 2023-05-22 | End: 1900-01-01

## 2023-05-22 NOTE — HISTORY OF PRESENT ILLNESS
[de-identified] : This is a well-known patient she is status post right hip replacement she is here with complaint of right knee pain.  Patient that she has difficulty going up and down stairs pain is ongoing for about 2 weeks recalls no specific accident or injury.  Patient is here for evaluation of her recent right knee pain.

## 2023-05-22 NOTE — DISCUSSION/SUMMARY
[Medication Risks Reviewed] : Medication risks reviewed [de-identified] : Patient I discussed the underlying possible etiology of her right knee pain.  She has no radiographic evidence of arthritis I believe that her knee pain will be self-limiting however to help improve the rate of her recovery patient will be placed on a Medrol Dosepak.  The reasonable risk benefits of medication were discussed in detail including potential side effects.  Reviewed her current medication profile there appears to be no contraindication to its use.\par \par Patient will follow-up in a week if not thoroughly improved for further evaluation possible MRI.\par \par This consultation lasted 35 minutes

## 2023-05-22 NOTE — PHYSICAL EXAM
[de-identified] : Right knee on exam today range of motion 0 to 130 degrees knee stable to varus varus valgus stress in both full extension and 90 degrees flexion.  The knee has a fairly benign appearance today there is no warmth minimal swelling no effusion noted.  Quad tone is good she is neurovascular intact distally. [de-identified] : Knee radiographs were ordered today AP standing individual and sunrise views were obtained showing well-preserved joint space in all 3 compartments of the knee no evidence of acute trauma or injury.

## 2023-09-18 ENCOUNTER — APPOINTMENT (OUTPATIENT)
Dept: COLORECTAL SURGERY | Facility: CLINIC | Age: 58
End: 2023-09-18
Payer: MEDICARE

## 2023-09-18 VITALS
HEART RATE: 51 BPM | WEIGHT: 233 LBS | RESPIRATION RATE: 16 BRPM | OXYGEN SATURATION: 99 % | HEIGHT: 67 IN | TEMPERATURE: 97.7 F | SYSTOLIC BLOOD PRESSURE: 154 MMHG | DIASTOLIC BLOOD PRESSURE: 79 MMHG | BODY MASS INDEX: 36.57 KG/M2

## 2023-09-18 DIAGNOSIS — Z80.6 FAMILY HISTORY OF LEUKEMIA: ICD-10-CM

## 2023-09-18 DIAGNOSIS — A63.0 ANOGENITAL (VENEREAL) WARTS: ICD-10-CM

## 2023-09-18 DIAGNOSIS — Z80.3 FAMILY HISTORY OF MALIGNANT NEOPLASM OF BREAST: ICD-10-CM

## 2023-09-18 DIAGNOSIS — Z80.0 FAMILY HISTORY OF MALIGNANT NEOPLASM OF DIGESTIVE ORGANS: ICD-10-CM

## 2023-09-18 PROCEDURE — 99212 OFFICE O/P EST SF 10 MIN: CPT

## 2024-03-18 ENCOUNTER — APPOINTMENT (OUTPATIENT)
Dept: COLORECTAL SURGERY | Facility: CLINIC | Age: 59
End: 2024-03-18
Payer: MEDICARE

## 2024-03-18 VITALS
SYSTOLIC BLOOD PRESSURE: 111 MMHG | TEMPERATURE: 98.6 F | BODY MASS INDEX: 36.88 KG/M2 | HEIGHT: 67 IN | OXYGEN SATURATION: 96 % | DIASTOLIC BLOOD PRESSURE: 61 MMHG | HEART RATE: 61 BPM | WEIGHT: 235 LBS

## 2024-03-18 PROCEDURE — 46600 DIAGNOSTIC ANOSCOPY SPX: CPT

## 2024-03-18 NOTE — PHYSICAL EXAM
[Fistula] : no fistulas [Wart] : no warts [Ulcer ___ cm] : no ulcers [Nonprolapsing] : a nonprolapsing (grade I) [Tender, Swollen] : nontender, non-swollen [Thrombosed] : that was not thrombosed [Skin Tags] : there were no residual hemorrhoidal skin tags seen [Normal] : was normal [Stool Sample Taken] : no stool obtained on rectal exam [None] : there was no rectal mass  [Gross Blood] : no gross blood [de-identified] : ext: no lesions.  Digital: no lesions, WNL tone, no stenosis.  anoscopy: no lesions seen, 3 passes

## 2024-03-18 NOTE — ASSESSMENT
[FreeTextEntry1] : No lesions seen, No symptoms. Will increase to q 1 year office visits and anoscopy. (patient requests and I think that is reasonable). If patient notes sx's to call sooner. Has had colonoscopy not too long ago.  No need for another.

## 2024-03-18 NOTE — REVIEW OF SYSTEMS
[Arthralgias] : arthralgias [Limb Pain] : limb pain [Negative] : Psychiatric [Fever] : no fever [Chills] : no chills [Feeling Poorly] : not feeling poorly [Feeling Tired] : not feeling tired [Recent Weight Gain (___ Lbs)] : no recent weight gain [Recent Weight Loss (___ Lbs)] : no recent weight loss [Nosebleeds] : no nosebleeds [Sore Throat] : no sore throat [Nasal Discharge] : no nasal discharge [Chest Pain] : no chest pain [Hoarseness] : no hoarseness [Palpitations] : no palpitations [Shortness Of Breath] : no shortness of breath [Wheezing] : no wheezing [Cough] : no cough [SOB on Exertion] : no shortness of breath during exertion [Abdominal Pain] : no abdominal pain [Vomiting] : no vomiting [Diarrhea] : no diarrhea [Constipation] : no constipation [Dysuria] : no dysuria [Incontinence] : no incontinence [Pelvic Pain] : no pelvic pain [Dysmenorrhea] : no dysmenorrhea [Confused] : no confusion [Convulsions] : no convulsions [Dizziness] : no dizziness [Fainting] : no fainting [Suicidal] : not suicidal [Sleep Disturbances] : no sleep disturbances [Anxiety] : no anxiety [Easy Bleeding] : no tendency for easy bleeding [Depression] : no depression [Easy Bruising] : no tendency for easy bruising

## 2024-03-18 NOTE — HISTORY OF PRESENT ILLNESS
[FreeTextEntry1] : 57 y/o F with Hx. of Anal condyloma here for routine f/u. Denies perianal itching or irritation.  Bowel movements are every 4 days and stools are hard. It has been like this for years. Sometimes sees spots of blood when wiping. Denies abdominal pain, nausea or vomiting.  Has chronic back pain. She follows up with spine surgery. She is scheduled for procedure tomorrow.   Colonoscopy scheduled with Dr. Hamilton on 6/2023. No polyps as per patient.

## 2024-07-22 ENCOUNTER — APPOINTMENT (OUTPATIENT)
Dept: ORTHOPEDIC SURGERY | Facility: CLINIC | Age: 59
End: 2024-07-22
Payer: MEDICARE

## 2024-07-22 VITALS — HEIGHT: 67 IN | WEIGHT: 230 LBS | BODY MASS INDEX: 36.1 KG/M2

## 2024-07-22 PROCEDURE — 72100 X-RAY EXAM L-S SPINE 2/3 VWS: CPT

## 2024-07-22 PROCEDURE — 99214 OFFICE O/P EST MOD 30 MIN: CPT

## 2024-07-22 PROCEDURE — 73521 X-RAY EXAM HIPS BI 2 VIEWS: CPT

## 2024-07-22 RX ORDER — METHYLPREDNISOLONE 4 MG/1
4 TABLET ORAL
Qty: 1 | Refills: 1 | Status: ACTIVE | COMMUNITY
Start: 2024-07-22 | End: 1900-01-01

## 2024-07-22 NOTE — REASON FOR VISIT
[Follow-Up Visit] : a follow-up visit for [Artificial Hip Joint] : an artificial hip joint [Hip Pain] : hip pain [Knee Pain] : knee pain [Other: ____] : [unfilled] [FreeTextEntry2] : patient is complaining of pain in the hip area that travel down tot he knee on the right side.

## 2024-07-22 NOTE — PHYSICAL EXAM
[de-identified] : Patient is full passive internal/external rotation of both hips no significant pain restriction mechanical block.Patient complained of some vague ache in the lumbar spine region and also some vague achiness in the lateral aspect of both thighs. [de-identified] : Hip radiographs were ordered today AP and lateral both hips were obtained and we have the ability comparison to radiographs from 2019 in the interval there seems to be evidence of possible loosening of both hips.  Both hips have increased spacing between the lateral shoulder of the prosthesis and the tibia of the left hip also has developed a distal pedestal.

## 2024-07-22 NOTE — DISCUSSION/SUMMARY
[de-identified] : Patient and I reviewed the radiographic findings especially interval changes that we have noticed on the radiographs.  I indicated the patient's degree of potential sign of loosening but is not an absolute finding.  Patient was sent for bilateral hip MRIs to evaluate for the status of both these implants we will notify her of the findings once they are available for review.  	      This consultation was 35 minutes.

## 2024-07-22 NOTE — HISTORY OF PRESENT ILLNESS
[de-identified] : Well-known patient returns today she is status post 12 years right hip replacement 15 years left hip replacement she is complaining of both lateral thigh pain.  She is ambulating with a cane patient states the pain is slowly worsening with time she recalls no specific accident injury initiating traumatic event I was the surgeon for the right hip and a number of colic performed left 15 years ago.  No recent accident injury initiating traumatic event.  She is currently taking a Medrol Dosepak for presumed lumbar pathology.  Improvement with no significant improvement.

## 2024-09-12 ENCOUNTER — APPOINTMENT (OUTPATIENT)
Dept: NEUROSURGERY | Facility: CLINIC | Age: 59
End: 2024-09-12
Payer: MEDICARE

## 2024-09-12 VITALS
HEIGHT: 67 IN | HEART RATE: 69 BPM | OXYGEN SATURATION: 99 % | SYSTOLIC BLOOD PRESSURE: 191 MMHG | TEMPERATURE: 97.6 F | RESPIRATION RATE: 16 BRPM | DIASTOLIC BLOOD PRESSURE: 72 MMHG | BODY MASS INDEX: 36.1 KG/M2 | WEIGHT: 230 LBS

## 2024-09-12 VITALS — SYSTOLIC BLOOD PRESSURE: 147 MMHG | DIASTOLIC BLOOD PRESSURE: 86 MMHG | HEART RATE: 70 BPM

## 2024-09-12 DIAGNOSIS — D33.4 BENIGN NEOPLASM OF SPINAL CORD: ICD-10-CM

## 2024-09-12 PROCEDURE — 99203 OFFICE O/P NEW LOW 30 MIN: CPT

## 2024-09-17 ENCOUNTER — APPOINTMENT (OUTPATIENT)
Dept: NEUROSURGERY | Facility: CLINIC | Age: 59
End: 2024-09-17
Payer: MEDICARE

## 2024-09-17 VITALS — HEIGHT: 67 IN | WEIGHT: 230 LBS | BODY MASS INDEX: 36.1 KG/M2

## 2024-09-17 DIAGNOSIS — M54.50 LOW BACK PAIN, UNSPECIFIED: ICD-10-CM

## 2024-09-17 PROCEDURE — 99213 OFFICE O/P EST LOW 20 MIN: CPT

## 2024-09-23 ENCOUNTER — OUTPATIENT (OUTPATIENT)
Dept: OUTPATIENT SERVICES | Facility: HOSPITAL | Age: 59
LOS: 1 days | End: 2024-09-23
Payer: MEDICARE

## 2024-09-23 VITALS
HEIGHT: 67 IN | SYSTOLIC BLOOD PRESSURE: 136 MMHG | WEIGHT: 246.04 LBS | HEART RATE: 58 BPM | RESPIRATION RATE: 18 BRPM | OXYGEN SATURATION: 99 % | TEMPERATURE: 98 F | DIASTOLIC BLOOD PRESSURE: 74 MMHG

## 2024-09-23 DIAGNOSIS — Z98.890 OTHER SPECIFIED POSTPROCEDURAL STATES: Chronic | ICD-10-CM

## 2024-09-23 DIAGNOSIS — D33.4 BENIGN NEOPLASM OF SPINAL CORD: ICD-10-CM

## 2024-09-23 DIAGNOSIS — Z01.818 ENCOUNTER FOR OTHER PREPROCEDURAL EXAMINATION: ICD-10-CM

## 2024-09-23 DIAGNOSIS — Z98.891 HISTORY OF UTERINE SCAR FROM PREVIOUS SURGERY: Chronic | ICD-10-CM

## 2024-09-23 DIAGNOSIS — Z90.49 ACQUIRED ABSENCE OF OTHER SPECIFIED PARTS OF DIGESTIVE TRACT: Chronic | ICD-10-CM

## 2024-09-23 DIAGNOSIS — Z96.649 PRESENCE OF UNSPECIFIED ARTIFICIAL HIP JOINT: Chronic | ICD-10-CM

## 2024-09-23 DIAGNOSIS — Z96.642 PRESENCE OF LEFT ARTIFICIAL HIP JOINT: Chronic | ICD-10-CM

## 2024-09-23 DIAGNOSIS — C18.9 MALIGNANT NEOPLASM OF COLON, UNSPECIFIED: Chronic | ICD-10-CM

## 2024-09-23 DIAGNOSIS — Z87.19 PERSONAL HISTORY OF OTHER DISEASES OF THE DIGESTIVE SYSTEM: Chronic | ICD-10-CM

## 2024-09-23 DIAGNOSIS — Z96.641 PRESENCE OF RIGHT ARTIFICIAL HIP JOINT: Chronic | ICD-10-CM

## 2024-09-23 LAB
A1C WITH ESTIMATED AVERAGE GLUCOSE RESULT: 8.9 % — HIGH (ref 4–5.6)
ALBUMIN SERPL ELPH-MCNC: 4.2 G/DL — SIGNIFICANT CHANGE UP (ref 3.5–5.2)
ALP SERPL-CCNC: 111 U/L — SIGNIFICANT CHANGE UP (ref 30–115)
ALT FLD-CCNC: 22 U/L — SIGNIFICANT CHANGE UP (ref 0–41)
ANION GAP SERPL CALC-SCNC: 13 MMOL/L — SIGNIFICANT CHANGE UP (ref 7–14)
APTT BLD: 33 SEC — SIGNIFICANT CHANGE UP (ref 27–39.2)
AST SERPL-CCNC: 21 U/L — SIGNIFICANT CHANGE UP (ref 0–41)
BASOPHILS # BLD AUTO: 0.05 K/UL — SIGNIFICANT CHANGE UP (ref 0–0.2)
BASOPHILS NFR BLD AUTO: 0.7 % — SIGNIFICANT CHANGE UP (ref 0–1)
BILIRUB SERPL-MCNC: 0.4 MG/DL — SIGNIFICANT CHANGE UP (ref 0.2–1.2)
BLD GP AB SCN SERPL QL: SIGNIFICANT CHANGE UP
BUN SERPL-MCNC: 20 MG/DL — SIGNIFICANT CHANGE UP (ref 10–20)
CALCIUM SERPL-MCNC: 9.8 MG/DL — SIGNIFICANT CHANGE UP (ref 8.4–10.5)
CHLORIDE SERPL-SCNC: 101 MMOL/L — SIGNIFICANT CHANGE UP (ref 98–110)
CO2 SERPL-SCNC: 29 MMOL/L — SIGNIFICANT CHANGE UP (ref 17–32)
CREAT SERPL-MCNC: 1.2 MG/DL — SIGNIFICANT CHANGE UP (ref 0.7–1.5)
EGFR: 53 ML/MIN/1.73M2 — LOW
EOSINOPHIL # BLD AUTO: 0.12 K/UL — SIGNIFICANT CHANGE UP (ref 0–0.7)
EOSINOPHIL NFR BLD AUTO: 1.8 % — SIGNIFICANT CHANGE UP (ref 0–8)
ESTIMATED AVERAGE GLUCOSE: 209 MG/DL — HIGH (ref 68–114)
GLUCOSE SERPL-MCNC: 227 MG/DL — HIGH (ref 70–99)
HCT VFR BLD CALC: 40.6 % — SIGNIFICANT CHANGE UP (ref 37–47)
HGB BLD-MCNC: 13.2 G/DL — SIGNIFICANT CHANGE UP (ref 12–16)
IMM GRANULOCYTES NFR BLD AUTO: 0.3 % — SIGNIFICANT CHANGE UP (ref 0.1–0.3)
INR BLD: 1.12 RATIO — SIGNIFICANT CHANGE UP (ref 0.65–1.3)
LYMPHOCYTES # BLD AUTO: 1.42 K/UL — SIGNIFICANT CHANGE UP (ref 1.2–3.4)
LYMPHOCYTES # BLD AUTO: 21.2 % — SIGNIFICANT CHANGE UP (ref 20.5–51.1)
MCHC RBC-ENTMCNC: 29.2 PG — SIGNIFICANT CHANGE UP (ref 27–31)
MCHC RBC-ENTMCNC: 32.5 G/DL — SIGNIFICANT CHANGE UP (ref 32–37)
MCV RBC AUTO: 89.8 FL — SIGNIFICANT CHANGE UP (ref 81–99)
MONOCYTES # BLD AUTO: 0.55 K/UL — SIGNIFICANT CHANGE UP (ref 0.1–0.6)
MONOCYTES NFR BLD AUTO: 8.2 % — SIGNIFICANT CHANGE UP (ref 1.7–9.3)
MRSA PCR RESULT.: NEGATIVE — SIGNIFICANT CHANGE UP
NEUTROPHILS # BLD AUTO: 4.55 K/UL — SIGNIFICANT CHANGE UP (ref 1.4–6.5)
NEUTROPHILS NFR BLD AUTO: 67.8 % — SIGNIFICANT CHANGE UP (ref 42.2–75.2)
NRBC # BLD: 0 /100 WBCS — SIGNIFICANT CHANGE UP (ref 0–0)
PLATELET # BLD AUTO: 250 K/UL — SIGNIFICANT CHANGE UP (ref 130–400)
PMV BLD: 10.8 FL — HIGH (ref 7.4–10.4)
POTASSIUM SERPL-MCNC: 4.2 MMOL/L — SIGNIFICANT CHANGE UP (ref 3.5–5)
POTASSIUM SERPL-SCNC: 4.2 MMOL/L — SIGNIFICANT CHANGE UP (ref 3.5–5)
PROT SERPL-MCNC: 6.8 G/DL — SIGNIFICANT CHANGE UP (ref 6–8)
PROTHROM AB SERPL-ACNC: 12.8 SEC — SIGNIFICANT CHANGE UP (ref 9.95–12.87)
RBC # BLD: 4.52 M/UL — SIGNIFICANT CHANGE UP (ref 4.2–5.4)
RBC # FLD: 15.5 % — HIGH (ref 11.5–14.5)
SODIUM SERPL-SCNC: 143 MMOL/L — SIGNIFICANT CHANGE UP (ref 135–146)
WBC # BLD: 6.71 K/UL — SIGNIFICANT CHANGE UP (ref 4.8–10.8)
WBC # FLD AUTO: 6.71 K/UL — SIGNIFICANT CHANGE UP (ref 4.8–10.8)

## 2024-09-23 PROCEDURE — 93010 ELECTROCARDIOGRAM REPORT: CPT

## 2024-09-23 PROCEDURE — 87640 STAPH A DNA AMP PROBE: CPT

## 2024-09-23 PROCEDURE — 86850 RBC ANTIBODY SCREEN: CPT

## 2024-09-23 PROCEDURE — 86900 BLOOD TYPING SEROLOGIC ABO: CPT

## 2024-09-23 PROCEDURE — 93005 ELECTROCARDIOGRAM TRACING: CPT

## 2024-09-23 PROCEDURE — 85730 THROMBOPLASTIN TIME PARTIAL: CPT

## 2024-09-23 PROCEDURE — 99214 OFFICE O/P EST MOD 30 MIN: CPT | Mod: 25

## 2024-09-23 PROCEDURE — 36415 COLL VENOUS BLD VENIPUNCTURE: CPT

## 2024-09-23 PROCEDURE — 80053 COMPREHEN METABOLIC PANEL: CPT

## 2024-09-23 PROCEDURE — 85025 COMPLETE CBC W/AUTO DIFF WBC: CPT

## 2024-09-23 PROCEDURE — 86901 BLOOD TYPING SEROLOGIC RH(D): CPT

## 2024-09-23 PROCEDURE — 85610 PROTHROMBIN TIME: CPT

## 2024-09-23 PROCEDURE — 83036 HEMOGLOBIN GLYCOSYLATED A1C: CPT

## 2024-09-23 PROCEDURE — 87641 MR-STAPH DNA AMP PROBE: CPT

## 2024-09-23 NOTE — H&P PST ADULT - MUSCULOSKELETAL
ROM intact/strength 5/5 bilateral upper extremities/strength 5/5 bilateral lower extremities/back exam/extremities exam/abnormal gait

## 2024-09-23 NOTE — H&P PST ADULT - NSICDXFAMILYHX_GEN_ALL_CORE_FT
FAMILY HISTORY:  Father  Still living? No  Family history of diabetes mellitus (DM), Age at diagnosis: Age Unknown  FH: renal failure, Age at diagnosis: Age Unknown    Mother  Still living? Unknown  FH: breast cancer, Age at diagnosis: Age Unknown  FH: colon cancer, Age at diagnosis: Age Unknown    Grandparent  Still living? No  FH: breast cancer, Age at diagnosis: Age Unknown    Aunt  Still living? No  FH: breast cancer, Age at diagnosis: Age Unknown  FH: colon cancer, Age at diagnosis: Age Unknown

## 2024-09-23 NOTE — H&P PST ADULT - REASON FOR ADMISSION
57 Y/O FEMALE HERE FOR PRE-ADMISSION SURGICAL TESTING. PATIENT REPORTS SHE HAS A H/O LOW BACK PAIN X15 YEARS WITH RADICULOPATHY TO B/L LE. PAIN IS 9/10, SHARP. TAKES OXYCODONE/APAP 10/325MG PO Q6 HOURS WITH MINIMAL RELIEF. SHE STATES IT'S HARD TO GET IN AND OUT OF THE CAR AND HARD TO STAND FOR LONG PERIODS OF TIME. MRI OF THE LUMBAR SPINE 2 WEEKS AGO REVEALED "BULGING DISCS", L4-L5, L5-S1.   NOW FOR SCHEDULED LEFT SIDED LUMBAR 4-5 LUMBAR 5- SACRAL 1 ENDOSCOPIC FORAMINOTOMY. 57 Y/O FEMALE HERE FOR PRE-ADMISSION SURGICAL TESTING. PATIENT REPORTS SHE HAS A H/O LOW BACK PAIN X15 YEARS WITH RADICULOPATHY TO B/L LE. PAIN IS 9/10, SHARP. TAKES OXYCODONE/APAP 10/325MG PO Q6 HOURS WITH MINIMAL RELIEF. SHE STATES IT'S HARD TO GET IN AND OUT OF THE CAR AND HARD TO STAND FOR LONG PERIODS OF TIME. SHE'S HAD MULTIPLE LUMBAR AFUA'S WITH MINIMAL TO NO RELIEF. MRI OF THE LUMBAR SPINE 2 WEEKS AGO REVEALED "BULGING DISCS", L4-L5, L5-S1.   NOW FOR SCHEDULED LEFT SIDED LUMBAR 4-5 LUMBAR 5- SACRAL 1 ENDOSCOPIC FORAMINOTOMY. 59 Y/O FEMALE HERE FOR PRE-ADMISSION SURGICAL TESTING. H/O DM, SARCOIDOSIS, CIRRHOSIS OF THE LIVER (MCFARLANE), CKD II, PAD, LLE DVT, HTN & HLD. PATIENT REPORTS SHE HAS A H/O LOW BACK PAIN X15 YEARS WITH RADICULOPATHY TO B/L LE. PAIN IS 9/10, SHARP. TAKES OXYCODONE/APAP 10/325MG PO Q6 HOURS WITH MINIMAL RELIEF. SHE STATES IT'S HARD TO GET IN AND OUT OF THE CAR AND HARD TO STAND FOR LONG PERIODS OF TIME. SHE'S HAD MULTIPLE LUMBAR AFUA'S WITH MINIMAL TO NO RELIEF. MRI OF THE LUMBAR SPINE 2 WEEKS AGO REVEALED "BULGING DISCS", L4-L5, L5-S1.   NOW FOR SCHEDULED LEFT SIDED LUMBAR 4-5 LUMBAR 5- SACRAL 1 ENDOSCOPIC FORAMINOTOMY.

## 2024-09-23 NOTE — H&P PST ADULT - HISTORY OF PRESENT ILLNESS
PATIENT DENIES CHEST PAIN, SHORTNESS OF BREATH, PALPITATIONS, COUGHING, FEVER, DYSURIA.      NO COUGH, FEVER, SORE THROAT, HEADACHE, LOSS OF TASTE OR SMELL. NO KNOWN EXPOSURE TO ANYONE WITH COVID. PATIENT WAS INSTRUCTED TO ISOLATE FROM NOW UNTIL THE SURGERY.    Anesthesia Alert  + Difficult Airway (CLASS IV)  NO--History of neck surgery or radiation  NO--Limited ROM of neck  NO--History of Malignant hyperthermia  NO--Personal or family history of Pseudocholinesterase deficiency  NO--Prior Anesthesia Complication  NO--Latex Allergy  NO--Loose teeth  NO--History of Rheumatoid Arthritis  + KATELIN (NO CPAP)  + BLEEDING RISK (ON ELIQUIS)    Opioid Risk Assessment Tool (Female)       Family history of substance abuse            Alcohol (0)            Illegal Drugs (0)            Prescription drugs (0)       Personal history of substance abuse            Alcohol (0)            Illegal Drugs (0)            Prescription drugs (0)       Age between 16-45 (0)       History of preadolescent sexual abuse (0)       Psychological disease (ADD, ADHD, OCD, Bipolar Disorder, Schizophrenia, Depression) (0)    Scoring Totals:  Low Risk (0)    The patient confirms they have received, reviewed, and understood their preoperative spine education material.  In the event of any questions or concerns leading up to the surgery, the patient is aware of how to contact the surgeon's office or the [facility] SPINE PROGRAM.             PATIENT DENIES CHEST PAIN, SHORTNESS OF BREATH, PALPITATIONS, COUGHING, FEVER, DYSURIA.      NO COUGH, FEVER, SORE THROAT, HEADACHE, LOSS OF TASTE OR SMELL. NO KNOWN EXPOSURE TO ANYONE WITH COVID. PATIENT WAS INSTRUCTED TO ISOLATE FROM NOW UNTIL THE SURGERY.    Anesthesia Alert  + Difficult Airway (CLASS IV)  NO--History of neck surgery or radiation  NO--Limited ROM of neck  NO--History of Malignant hyperthermia  NO--Personal or family history of Pseudocholinesterase deficiency  NO--Prior Anesthesia Complication  NO--Latex Allergy  NO--Loose teeth  NO--History of Rheumatoid Arthritis  + KATELIN (NO CPAP)  + BLEEDING RISK (ON ELIQUIS)    RCRI=2  DASI=5.29 METS    Opioid Risk Assessment Tool (Female)       Family history of substance abuse            Alcohol (0)            Illegal Drugs (0)            Prescription drugs (0)       Personal history of substance abuse            Alcohol (0)            Illegal Drugs (0)            Prescription drugs (0)       Age between 16-45 (0)       History of preadolescent sexual abuse (0)       Psychological disease (ADD, ADHD, OCD, Bipolar Disorder, Schizophrenia, Depression) (0)    Scoring Totals:  Low Risk (0)    The patient confirms they have received, reviewed, and understood their preoperative spine education material.  In the event of any questions or concerns leading up to the surgery, the patient is aware of how to contact the surgeon's office or the [facility] SPINE PROGRAM.

## 2024-09-23 NOTE — H&P PST ADULT - NSICDXPASTMEDICALHX_GEN_ALL_CORE_FT
PAST MEDICAL HISTORY:  Anal warts     Back pain     Deep vein thrombosis (DVT) of lower extremity     DM (diabetes mellitus)     Fatty liver     H/O diabetic neuropathy     HLD (hyperlipidemia)     HPV in female     HTN (hypertension)     Liver cirrhosis     KATELIN (obstructive sleep apnea)     PAD (peripheral artery disease)     Sarcoidosis Lung and Liver    Stage 2 chronic kidney disease      PAST MEDICAL HISTORY:  Anal warts     Back pain     Deep vein thrombosis (DVT) of lower extremity     DM (diabetes mellitus)     Fatty liver     H/O diabetic neuropathy     HLD (hyperlipidemia)     HPV in female     HTN (hypertension)     Liver cirrhosis     MCFARLANE (nonalcoholic steatohepatitis)     KATELIN (obstructive sleep apnea)     PAD (peripheral artery disease)     Sarcoidosis Lung and Liver    Stage 2 chronic kidney disease

## 2024-09-23 NOTE — H&P PST ADULT - OTHER CARE PROVIDERS
CARD-[facility]        VASCULAR- LONG- DR. WILHELM        VASCULAR- DR. SHORE     PAIN MANAGEMENT- MONICA RODRIGUEZ

## 2024-09-23 NOTE — H&P PST ADULT - NSICDXPASTSURGICALHX_GEN_ALL_CORE_FT
PAST SURGICAL HISTORY:  History of anal lesion     History of      History of cholecystectomy     History of foot surgery     History of hernia repair     History of shoulder surgery     S/p bilateral carpal tunnel release     S/P hip replacement, left     S/P hip replacement, right     S/P trigger finger release

## 2024-09-24 DIAGNOSIS — D33.4 BENIGN NEOPLASM OF SPINAL CORD: ICD-10-CM

## 2024-09-24 DIAGNOSIS — Z01.818 ENCOUNTER FOR OTHER PREPROCEDURAL EXAMINATION: ICD-10-CM

## 2024-09-26 NOTE — ASSESSMENT
[FreeTextEntry1] : 59yo F with a history of low back pain and bilateral LE radiculopathy, left worse than right who has exhausted conservative efforts is to be scheduled for a left sided L4-5, L5-S1 endoscopic foraminotomy.   Ms. STEARNS is aware that she may call barring any concerns in the meantime.   No concerns were identified during the visit and we thank her for allowing us to be a part of her care team once again.   Stevan Dawson MD, FAADAMEON Stout MS, FNP-BC

## 2024-09-26 NOTE — HISTORY OF PRESENT ILLNESS
[FreeTextEntry1] : Ms. STEARNS is a pleasant 58-year-old female presenting to the neurosurgery office today alongside her  to discuss a surgical procedure in further detail.   She continues to experience low back pain with bilateral LE radiculopathy, left worse than right. She is under the care of pain management specialist, Dr. Nguyễn and has undergone many interventional procedures none of which provided relief of s/s.   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  MRI was reviewed at length with both the patient and her . A surgical intervention in the form of a left sided L4-5, L5-S1 endoscopic foraminotomy -risks and benefits of such - were discussed today. The patient wishes to proceed and will be scheduled accordingly.

## 2024-09-26 NOTE — END OF VISIT
[FreeTextEntry3] : ATTENDING ADDENDUM  I, Dr. Dawson, personally performed the evaluation and management (E/M) services for this patient. That E/M includes conducting the clinically appropriate initial history &/or exam, assessing all conditions, and establishing the plan of care. Today, my HUMBERTO, was here to observe my evaluation and management service for this patient & follow plan of care established by me going forward Stevan Dawson MD Director, Complex and Adult Spinal Deformity Surgery Department of Neurological Surgery [address], [place] [address] Tel: [phone] Email: [email or web address]

## 2024-09-26 NOTE — END OF VISIT
[FreeTextEntry3] : ATTENDING ADDENDUM  I, Dr. Dawson, personally performed the evaluation and management (E/M) services for this patient. That E/M includes conducting the clinically appropriate initial history &/or exam, assessing all conditions, and establishing the plan of care. Today, my HUMBERTO, was here to observe my evaluation and management service for this patient & follow plan of care established by me going forward Stevan Dawson MD Director, Complex and Adult Spinal Deformity Surgery Department of Neurological Surgery [address], [place] [address] Tel: [phone] Email: [email or web address]

## 2024-10-04 NOTE — ASU PATIENT PROFILE, ADULT - FALL HARM RISK - RISK INTERVENTIONS

## 2024-10-04 NOTE — ASU PATIENT PROFILE, ADULT - MUTUALITY COMMENT, PROFILE
Bill For Surgical Tray: no Curettage Text: The wound bed was treated with curettage after the biopsy was performed. Post-Care Instructions: Patient is to keep the biopsy site dry overnight. Then wash daily with gentle soap and water and replace the bandage and Vaseline daily. It is important to keep the site greasy and covered. Dressing: bandage Cryotherapy Text: The wound bed was treated with cryotherapy after the biopsy was performed. Path Notes (To The Dermatopathologist): Check margins Lab Facility: [id number] Notification Instructions: Patient will be notified of biopsy results. However, patient should call the office if not contacted within 2 weeks. Detail Level: Detailed Anesthesia Volume In Cc: 0.5 Lab: [id number] Electrodesiccation Text: The wound bed was treated with electrodesiccation after the biopsy was performed. Hemostasis: Drysol Depth Of Biopsy: dermis Consent: Written consent was obtained for risks including but not limited to scarring, infection, bleeding, incomplete removal, nerve damage and allergy to anesthesia. Biopsy Type: H and E Electrodesiccation And Curettage Text: The wound bed was treated with electrodesiccation and curettage after the biopsy was performed. Silver Nitrate Text: The wound bed was treated with silver nitrate after the biopsy was performed. Anesthesia Type: 1% lidocaine with epinephrine Billing Type: Patient Bill Type Of Destruction Used: Curettage Wound Care: Petrolatum Was A Bandage Applied: Yes Biopsy Method: Personna blade Information: Selecting Yes will display possible errors in your note based on the variables you have selected. This validation is only offered as a suggestion for you. PLEASE NOTE THAT THE VALIDATION TEXT WILL BE REMOVED WHEN YOU FINALIZE YOUR NOTE. IF YOU WANT TO FAX A PRELIMINARY NOTE YOU WILL NEED TO TOGGLE THIS TO 'NO' IF YOU DO NOT WANT IT IN YOUR FAXED NOTE. Detail Level: Simple none

## 2024-10-04 NOTE — ASU PATIENT PROFILE, ADULT - MEDICATION HERBAL REMEDIES, PROFILE
Gastroenterology Progress Note    12/24/2019    Admit Date: 12/22/2019    Subjective: Follow up for:  Elevated LFTs in the setting of +hepatitis C and daily wine     Patient states he is going home today. Wants Rx for ativan at discharge to help with ETOH withdrawals.       Current Facility-Administered Medications   Medication Dose Route Frequency    mirtazapine (REMERON) tablet 30 mg  30 mg Oral QHS    folic acid (FOLVITE) tablet 1 mg  1 mg Oral DAILY    potassium chloride SR (KLOR-CON 10) tablet 10 mEq  10 mEq Oral DAILY    sertraline (ZOLOFT) tablet 100 mg  100 mg Oral DAILY    tamsulosin (FLOMAX) capsule 0.4 mg  0.4 mg Oral DAILY    therapeutic multivitamin (THERAGRAN) tablet 1 Tab  1 Tab Oral DAILY    thiamine mononitrate (B-1) tablet 100 mg  100 mg Oral DAILY    sodium chloride (NS) flush 5-40 mL  5-40 mL IntraVENous Q8H    sodium chloride (NS) flush 5-40 mL  5-40 mL IntraVENous PRN    acetaminophen (TYLENOL) tablet 650 mg  650 mg Oral Q4H PRN    morphine injection 1 mg  1 mg IntraVENous Q4H PRN    ondansetron (ZOFRAN) injection 4 mg  4 mg IntraVENous Q4H PRN    heparin (porcine) injection 5,000 Units  5,000 Units SubCUTAneous Q8H    nitroglycerin (Tridil) 200 mcg/ml infusion  0-20 mcg/min IntraVENous TITRATE    niCARdipine (CARDENE) 25 mg in 0.9% sodium chloride 250 mL infusion  0-15 mg/hr IntraVENous TITRATE    acetaminophen (TYLENOL) tablet 650 mg  650 mg Oral Q6H PRN    influenza vaccine 2019-20 (6 mos+)(PF) (FLUARIX/FLULAVAL/FLUZONE QUAD) injection 0.5 mL  0.5 mL IntraMUSCular PRIOR TO DISCHARGE    carvedilol (COREG) tablet 12.5 mg  12.5 mg Oral BID WITH MEALS    levalbuterol (XOPENEX) nebulizer soln 0.63 mg/3 mL  0.63 mg Nebulization Q6H PRN    aspirin chewable tablet 81 mg  81 mg Oral DAILY    nitroglycerin (NITROSTAT) tablet 0.4 mg  0.4 mg SubLINGual PRN    LORazepam (ATIVAN) injection 2 mg  2 mg IntraVENous Q1H PRN    LORazepam (ATIVAN) injection 4 mg  4 mg IntraVENous Q1H PRN    0.9% sodium chloride infusion  50 mL/hr IntraVENous CONTINUOUS     Facility-Administered Medications Ordered in Other Encounters   Medication Dose Route Frequency    sodium chloride (NS) flush 10 mL  10 mL IntraVENous PRN        Objective:     Blood pressure 137/88, pulse 70, temperature 98.2 °F (36.8 °C), resp. rate 16, height 5' 8\" (1.727 m), weight 99.8 kg (220 lb), SpO2 94 %. 12/24 0701 - 12/24 1900  In: 400.8 [I.V.:400.8]  Out: -     12/22 1901 - 12/24 0700  In: 1868.8 [P.O.:720;  I.V.:1148.8]  Out: 400 [Urine:400]    EXAM:      WDWN black male in NAD    Data Review    Recent Results (from the past 24 hour(s))   NUCLEAR CARDIAC STRESS TEST    Collection Time: 12/23/19 12:44 PM   Result Value Ref Range    Target  bpm    Exercise duration time 00:01:07     Stress Base Systolic  mmHg    Stress Base Diastolic BP 80 mmHg    Post peak  BPM    Baseline HR 93 BPM    Estimated workload 1.0 METS    Baseline  mmHg    O2 sat rest 95 %    Percent HR 80 %    Stress Base Diastolic BP 81 mmHg    Stress Rate Pressure Product 16,470 BPM*mmHg    ST Elevation (mm) 0 mm    ST Depression (mm) 0 mm   PROTHROMBIN TIME + INR    Collection Time: 12/23/19  2:45 PM   Result Value Ref Range    INR 1.1 0.9 - 1.1      Prothrombin time 11.7 (H) 9.0 - 11.1 sec   AMMONIA    Collection Time: 12/23/19  2:45 PM   Result Value Ref Range    Ammonia 26 <32 UMOL/L   CBC WITH AUTOMATED DIFF    Collection Time: 12/24/19  6:01 AM   Result Value Ref Range    WBC 9.8 4.1 - 11.1 K/uL    RBC 5.56 4.10 - 5.70 M/uL    HGB 14.2 12.1 - 17.0 g/dL    HCT 41.6 36.6 - 50.3 %    MCV 74.8 (L) 80.0 - 99.0 FL    MCH 25.5 (L) 26.0 - 34.0 PG    MCHC 34.1 30.0 - 36.5 g/dL    RDW 21.4 (H) 11.5 - 14.5 %    PLATELET 313 (L) 898 - 400 K/uL    NRBC 0.0 0  WBC    ABSOLUTE NRBC 0.00 0.00 - 0.01 K/uL    NEUTROPHILS 56 32 - 75 %    LYMPHOCYTES 28 12 - 49 %    MONOCYTES 11 5 - 13 %    EOSINOPHILS 4 0 - 7 %    BASOPHILS 1 0 - 1 %    IMMATURE GRANULOCYTES 0 0.0 - 0.5 %    ABS. NEUTROPHILS 5.5 1.8 - 8.0 K/UL    ABS. LYMPHOCYTES 2.8 0.8 - 3.5 K/UL    ABS. MONOCYTES 1.1 (H) 0.0 - 1.0 K/UL    ABS. EOSINOPHILS 0.4 0.0 - 0.4 K/UL    ABS. BASOPHILS 0.1 0.0 - 0.1 K/UL    ABS. IMM. GRANS. 0.0 0.00 - 0.04 K/UL    DF AUTOMATED     MAGNESIUM    Collection Time: 12/24/19  6:01 AM   Result Value Ref Range    Magnesium 1.8 1.6 - 2.4 mg/dL   METABOLIC PANEL, COMPREHENSIVE    Collection Time: 12/24/19  6:01 AM   Result Value Ref Range    Sodium 140 136 - 145 mmol/L    Potassium 3.4 (L) 3.5 - 5.1 mmol/L    Chloride 108 97 - 108 mmol/L    CO2 26 21 - 32 mmol/L    Anion gap 6 5 - 15 mmol/L    Glucose 98 65 - 100 mg/dL    BUN 19 6 - 20 MG/DL    Creatinine 1.22 0.70 - 1.30 MG/DL    BUN/Creatinine ratio 16 12 - 20      GFR est AA >60 >60 ml/min/1.73m2    GFR est non-AA 59 (L) >60 ml/min/1.73m2    Calcium 8.4 (L) 8.5 - 10.1 MG/DL    Bilirubin, total 1.4 (H) 0.2 - 1.0 MG/DL    ALT (SGPT) 76 12 - 78 U/L    AST (SGOT) 74 (H) 15 - 37 U/L    Alk.  phosphatase 88 45 - 117 U/L    Protein, total 7.6 6.4 - 8.2 g/dL    Albumin 2.9 (L) 3.5 - 5.0 g/dL    Globulin 4.7 (H) 2.0 - 4.0 g/dL    A-G Ratio 0.6 (L) 1.1 - 2.2     TSH 3RD GENERATION    Collection Time: 12/24/19  6:01 AM   Result Value Ref Range    TSH 1.07 0.36 - 3.74 uIU/mL     Recent Labs     12/24/19  0601 12/23/19  0358   WBC 9.8 9.7   HGB 14.2 14.8   HCT 41.6 43.1   * 108*     Recent Labs     12/24/19  0601 12/23/19  0358 12/22/19  1303    139 140   K 3.4* 3.0* 3.1*    106 108   CO2 26 26 16*   BUN 19 14 11   CREA 1.22 1.13 1.13   GLU 98 143* 172*   CA 8.4* 9.0 9.6   MG 1.8 2.0  --    PHOS  --  2.3*  --      Recent Labs     12/24/19  0601 12/23/19  0358 12/22/19  1303   SGOT 74* 106* 181*   ALT 76 101* 117*   AP 88 106 111   TBILI 1.4* 1.5* 1.2*   TP 7.6 8.7* 8.8*   ALB 2.9* 3.5 3.9   GLOB 4.7* 5.2* 4.9*     Recent Labs     12/23/19  1445   INR 1.1   PTP 11.7*      No results for input(s): FE, TIBC, PSAT, FERR in the last 72 hours. Lab Results   Component Value Date/Time    Folate 5.6 05/14/2019 05:02 AM      No results for input(s): PH, PCO2, PO2 in the last 72 hours. Recent Labs     12/23/19  0358 12/22/19  1603   TROIQ <0.05 <0.05     Lab Results   Component Value Date/Time    Cholesterol, total 122 05/14/2019 05:02 AM    HDL Cholesterol 66 05/14/2019 05:02 AM    LDL, calculated 46 05/14/2019 05:02 AM    Triglyceride 50 05/14/2019 05:02 AM    CHOL/HDL Ratio 1.8 05/14/2019 05:02 AM     Lab Results   Component Value Date/Time    Glucose (POC) 133 (H) 06/07/2014 02:46 AM    Glucose (POC) 72 (L) 06/07/2014 01:53 AM    Glucose (POC) 87 04/24/2014 11:46 AM    Glucose (POC) 93 04/24/2014 11:30 AM    Glucose (POC) 101 03/26/2014 07:43 PM    Glucose (POC) 87 09/23/2009 01:01 AM     Lab Results   Component Value Date/Time    Color YELLOW/STRAW 11/21/2019 10:37 PM    Appearance CLEAR 11/21/2019 10:37 PM    Specific gravity 1.015 11/21/2019 10:37 PM    Specific gravity >1.030 (H) 07/08/2014 12:19 PM    pH (UA) 6.0 11/21/2019 10:37 PM    Protein NEGATIVE  11/21/2019 10:37 PM    Glucose NEGATIVE  11/21/2019 10:37 PM    Ketone NEGATIVE  11/21/2019 10:37 PM    Bilirubin NEGATIVE  11/21/2019 10:37 PM    Urobilinogen 1.0 11/21/2019 10:37 PM    Nitrites NEGATIVE  11/21/2019 10:37 PM    Leukocyte Esterase NEGATIVE  11/21/2019 10:37 PM    Epithelial cells FEW 11/21/2019 10:37 PM    Bacteria NEGATIVE  11/21/2019 10:37 PM    WBC 0-4 11/21/2019 10:37 PM    RBC 0-5 11/21/2019 10:37 PM           Assessment:     Active Problems:    Chest pain (6/7/2014)      Elevated LFTs (12/23/2019)        Plan:     Patient should follow up with Dr. Lisandro Thrasher at [facility], [place] after discharge for treatment of hepatitis C and consideration of liver bx. Plts are depressed which could indicate significant fibrosis. He is advised to avoid all ETOH and voices good understanding.     SE Callejas  12/24/19  11:26 AM no

## 2024-10-07 ENCOUNTER — OUTPATIENT (OUTPATIENT)
Dept: OUTPATIENT SERVICES | Facility: HOSPITAL | Age: 59
LOS: 1 days | Discharge: HOME | End: 2024-10-07
Payer: MEDICARE

## 2024-10-07 ENCOUNTER — TRANSCRIPTION ENCOUNTER (OUTPATIENT)
Age: 59
End: 2024-10-07

## 2024-10-07 VITALS
TEMPERATURE: 98 F | OXYGEN SATURATION: 94 % | RESPIRATION RATE: 20 BRPM | HEART RATE: 57 BPM | DIASTOLIC BLOOD PRESSURE: 67 MMHG | SYSTOLIC BLOOD PRESSURE: 156 MMHG | WEIGHT: 229.94 LBS | HEIGHT: 67 IN

## 2024-10-07 VITALS
DIASTOLIC BLOOD PRESSURE: 65 MMHG | SYSTOLIC BLOOD PRESSURE: 150 MMHG | OXYGEN SATURATION: 99 % | RESPIRATION RATE: 18 BRPM | HEART RATE: 62 BPM

## 2024-10-07 DIAGNOSIS — M54.50 LOW BACK PAIN, UNSPECIFIED: ICD-10-CM

## 2024-10-07 DIAGNOSIS — Z98.890 OTHER SPECIFIED POSTPROCEDURAL STATES: Chronic | ICD-10-CM

## 2024-10-07 DIAGNOSIS — Z96.642 PRESENCE OF LEFT ARTIFICIAL HIP JOINT: Chronic | ICD-10-CM

## 2024-10-07 DIAGNOSIS — Z96.641 PRESENCE OF RIGHT ARTIFICIAL HIP JOINT: Chronic | ICD-10-CM

## 2024-10-07 DIAGNOSIS — D33.4 BENIGN NEOPLASM OF SPINAL CORD: ICD-10-CM

## 2024-10-07 DIAGNOSIS — Z87.19 PERSONAL HISTORY OF OTHER DISEASES OF THE DIGESTIVE SYSTEM: Chronic | ICD-10-CM

## 2024-10-07 DIAGNOSIS — Z98.891 HISTORY OF UTERINE SCAR FROM PREVIOUS SURGERY: Chronic | ICD-10-CM

## 2024-10-07 DIAGNOSIS — Z90.49 ACQUIRED ABSENCE OF OTHER SPECIFIED PARTS OF DIGESTIVE TRACT: Chronic | ICD-10-CM

## 2024-10-07 LAB — GLUCOSE BLDC GLUCOMTR-MCNC: 122 MG/DL — HIGH (ref 70–99)

## 2024-10-07 PROCEDURE — 62380 NDSC DCMPRN 1 NTRSPC LUMBAR: CPT | Mod: AS,LT

## 2024-10-07 PROCEDURE — C9399: CPT

## 2024-10-07 PROCEDURE — 62380 NDSC DCMPRN 1 NTRSPC LUMBAR: CPT | Mod: LT

## 2024-10-07 PROCEDURE — C1751: CPT

## 2024-10-07 PROCEDURE — 63035 LAMOT DCMPRN NRV RT EA ADDL: CPT | Mod: LT

## 2024-10-07 PROCEDURE — 82962 GLUCOSE BLOOD TEST: CPT

## 2024-10-07 PROCEDURE — 63030 LAMOT DCMPRN NRV RT 1 LMBR: CPT | Mod: LT

## 2024-10-07 PROCEDURE — 72100 X-RAY EXAM L-S SPINE 2/3 VWS: CPT

## 2024-10-07 RX ORDER — ATORVASTATIN CALCIUM 10 MG/1
1 TABLET, FILM COATED ORAL
Refills: 0 | DISCHARGE

## 2024-10-07 RX ORDER — METOPROLOL TARTRATE 50 MG
1 TABLET ORAL
Refills: 0 | DISCHARGE

## 2024-10-07 RX ORDER — INSULIN REGULAR, HUMAN 100/ML
70 VIAL (ML) INJECTION
Refills: 0 | DISCHARGE

## 2024-10-07 RX ORDER — LOSARTAN POTASSIUM 100 MG/1
1 TABLET, FILM COATED ORAL
Refills: 0 | DISCHARGE

## 2024-10-07 RX ORDER — CELECOXIB 200 MG/1
200 CAPSULE ORAL ONCE
Refills: 0 | Status: COMPLETED | OUTPATIENT
Start: 2024-10-07 | End: 2024-10-07

## 2024-10-07 RX ORDER — TORSEMIDE 10 MG/1
1 TABLET ORAL
Refills: 0 | DISCHARGE

## 2024-10-07 RX ORDER — ACETAMINOPHEN 325 MG
1000 TABLET ORAL ONCE
Refills: 0 | Status: COMPLETED | OUTPATIENT
Start: 2024-10-07 | End: 2024-10-07

## 2024-10-07 RX ORDER — SPIRONOLACTONE 100 MG/1
1 TABLET, FILM COATED ORAL
Refills: 0 | DISCHARGE

## 2024-10-07 RX ORDER — APREPITANT 125MG-80MG
40 KIT ORAL ONCE
Refills: 0 | Status: COMPLETED | OUTPATIENT
Start: 2024-10-07 | End: 2024-10-07

## 2024-10-07 RX ORDER — GABAPENTIN 800 MG/1
300 TABLET, FILM COATED ORAL ONCE
Refills: 0 | Status: COMPLETED | OUTPATIENT
Start: 2024-10-07 | End: 2024-10-07

## 2024-10-07 RX ORDER — APIXABAN 5 MG/1
1 TABLET, FILM COATED ORAL
Qty: 0 | Refills: 0 | DISCHARGE

## 2024-10-07 RX ORDER — INSULIN DEGLUDEC 100 U/ML
44 INJECTION, SOLUTION SUBCUTANEOUS
Refills: 0 | DISCHARGE

## 2024-10-07 RX ORDER — GABAPENTIN 800 MG/1
2 TABLET, FILM COATED ORAL
Refills: 0 | DISCHARGE

## 2024-10-07 RX ORDER — MULTI VITAMIN/MINERAL SUPPLEMENT WITH ASCORBIC ACID, NIACIN, PYRIDOXINE, PANTOTHENIC ACID, FOLIC ACID, RIBOFLAVIN, THIAMIN, BIOTIN, COBALAMIN AND ZINC. 60; 20; 12.5; 10; 10; 1.7; 1.5; 1; .3; .006 MG/1; MG/1; MG/1; MG/1; MG/1; MG/1; MG/1; MG/1; MG/1; MG/1
1 TABLET, COATED ORAL
Refills: 0 | DISCHARGE

## 2024-10-07 RX ORDER — EMPAGLIFLOZIN 25 MG/1
1 TABLET, FILM COATED ORAL
Refills: 0 | DISCHARGE

## 2024-10-07 RX ORDER — ASPIRIN 325 MG
0 TABLET ORAL
Qty: 0 | Refills: 0 | DISCHARGE

## 2024-10-07 RX ORDER — SODIUM CHLORIDE IRRIG SOLUTION 0.9 %
1000 SOLUTION, IRRIGATION IRRIGATION
Refills: 0 | Status: DISCONTINUED | OUTPATIENT
Start: 2024-10-07 | End: 2024-10-07

## 2024-10-07 RX ORDER — ONDANSETRON HCL/PF 4 MG/2 ML
4 VIAL (ML) INJECTION ONCE
Refills: 0 | Status: DISCONTINUED | OUTPATIENT
Start: 2024-10-07 | End: 2024-10-07

## 2024-10-07 RX ORDER — HYDROMORPHONE HYDROCHLORIDE 1 MG/ML
1 INJECTION, SOLUTION INTRAMUSCULAR; INTRAVENOUS; SUBCUTANEOUS
Refills: 0 | Status: DISCONTINUED | OUTPATIENT
Start: 2024-10-07 | End: 2024-10-07

## 2024-10-07 RX ORDER — GLIPIZIDE 5 MG/1
1 TABLET ORAL
Refills: 0 | DISCHARGE

## 2024-10-07 RX ORDER — OXYCODONE AND ACETAMINOPHEN 5; 325 MG/1; MG/1
1 TABLET ORAL
Refills: 0 | DISCHARGE

## 2024-10-07 RX ORDER — OMEGA-3-ACID ETHYL ESTERS 1 G/1
1000 CAPSULE, LIQUID FILLED ORAL
Refills: 0 | DISCHARGE

## 2024-10-07 RX ORDER — HYDROMORPHONE HYDROCHLORIDE 1 MG/ML
0.5 INJECTION, SOLUTION INTRAMUSCULAR; INTRAVENOUS; SUBCUTANEOUS
Refills: 0 | Status: DISCONTINUED | OUTPATIENT
Start: 2024-10-07 | End: 2024-10-07

## 2024-10-07 RX ADMIN — Medication 1000 MILLIGRAM(S): at 07:31

## 2024-10-07 RX ADMIN — APREPITANT 40 MILLIGRAM(S): KIT at 07:31

## 2024-10-07 RX ADMIN — CELECOXIB 200 MILLIGRAM(S): 200 CAPSULE ORAL at 07:35

## 2024-10-07 RX ADMIN — GABAPENTIN 300 MILLIGRAM(S): 800 TABLET, FILM COATED ORAL at 07:31

## 2024-10-07 RX ADMIN — CELECOXIB 200 MILLIGRAM(S): 200 CAPSULE ORAL at 07:31

## 2024-10-07 RX ADMIN — Medication 1000 MILLIGRAM(S): at 07:35

## 2024-10-07 NOTE — BRIEF OPERATIVE NOTE - NSICDXBRIEFPOSTOP_GEN_ALL_CORE_FT
POST-OP DIAGNOSIS:  Neurogenic claudication due to lumbar spinal stenosis 07-Oct-2024 09:19:19  Stevan Dawson

## 2024-10-07 NOTE — CHART NOTE - NSCHARTNOTEFT_GEN_A_CORE
PACU ANESTHESIA ADMISSION NOTE      Procedure: Lumbar foraminotomy      Post op diagnosis:  Neurogenic claudication due to lumbar spinal stenosis        ____  Intubated  TV:______       Rate: ______      FiO2: ______    _x___  Patent Airway    _x___  Full return of protective reflexes    _x___  Full recovery from anesthesia / back to baseline status    Vitals:  T(F): 97.5   HR: 58  BP: 156/56  RR: 18  SpO2: 99%    Mental Status:  ____ Awake   _____ Alert   ___x__ Drowsy   _____ Sedated    Nausea/Vomiting:  _x___  NO       ______Yes,   See Post - Op Orders         Pain Scale (0-10):  __0___    Treatment: _x___ None    ____ See Post - Op/PCA Orders    Post - Operative Fluids:   __x__ Oral   ____ See Post - Op Orders    Plan: Discharge:   _x___Home       _____Floor     _____Critical Care    _____  Other:_________________    Comments:  No anesthesia issues or complications noted.  Discharge when criteria met.

## 2024-10-07 NOTE — BRIEF OPERATIVE NOTE - NSICDXBRIEFPREOP_GEN_ALL_CORE_FT
PRE-OP DIAGNOSIS:  Neurogenic claudication due to lumbar spinal stenosis 07-Oct-2024 09:19:16  Stevan Dawson

## 2024-10-07 NOTE — ASU DISCHARGE PLAN (ADULT/PEDIATRIC) - CARE PROVIDER_API CALL
Stevan Dawson  Neurosurgery  [address] [place] [address], [address]  [place] [address]  Phone: [phone]  Fax: [phone]  Follow Up Time:

## 2024-10-08 PROBLEM — I73.9 PERIPHERAL VASCULAR DISEASE, UNSPECIFIED: Chronic | Status: ACTIVE | Noted: 2024-09-23

## 2024-10-08 PROBLEM — G47.33 OBSTRUCTIVE SLEEP APNEA (ADULT) (PEDIATRIC): Chronic | Status: ACTIVE | Noted: 2024-09-23

## 2024-10-08 PROBLEM — N18.2 CHRONIC KIDNEY DISEASE, STAGE 2 (MILD): Chronic | Status: ACTIVE | Noted: 2024-09-23

## 2024-10-08 PROBLEM — Z86.39 PERSONAL HISTORY OF OTHER ENDOCRINE, NUTRITIONAL AND METABOLIC DISEASE: Chronic | Status: ACTIVE | Noted: 2024-09-23

## 2024-10-08 PROBLEM — I82.409 ACUTE EMBOLISM AND THROMBOSIS OF UNSPECIFIED DEEP VEINS OF UNSPECIFIED LOWER EXTREMITY: Chronic | Status: ACTIVE | Noted: 2024-09-23

## 2024-10-08 PROBLEM — K76.0 FATTY (CHANGE OF) LIVER, NOT ELSEWHERE CLASSIFIED: Chronic | Status: ACTIVE | Noted: 2024-09-23

## 2024-10-08 PROBLEM — K75.81 NONALCOHOLIC STEATOHEPATITIS (NASH): Chronic | Status: ACTIVE | Noted: 2024-09-23

## 2024-10-09 DIAGNOSIS — Z79.84 LONG TERM (CURRENT) USE OF ORAL HYPOGLYCEMIC DRUGS: ICD-10-CM

## 2024-10-09 DIAGNOSIS — I12.9 HYPERTENSIVE CHRONIC KIDNEY DISEASE WITH STAGE 1 THROUGH STAGE 4 CHRONIC KIDNEY DISEASE, OR UNSPECIFIED CHRONIC KIDNEY DISEASE: ICD-10-CM

## 2024-10-09 DIAGNOSIS — N18.2 CHRONIC KIDNEY DISEASE, STAGE 2 (MILD): ICD-10-CM

## 2024-10-09 DIAGNOSIS — K74.60 UNSPECIFIED CIRRHOSIS OF LIVER: ICD-10-CM

## 2024-10-09 DIAGNOSIS — Z88.0 ALLERGY STATUS TO PENICILLIN: ICD-10-CM

## 2024-10-09 DIAGNOSIS — Z79.82 LONG TERM (CURRENT) USE OF ASPIRIN: ICD-10-CM

## 2024-10-09 DIAGNOSIS — E78.5 HYPERLIPIDEMIA, UNSPECIFIED: ICD-10-CM

## 2024-10-09 DIAGNOSIS — M54.17 RADICULOPATHY, LUMBOSACRAL REGION: ICD-10-CM

## 2024-10-09 DIAGNOSIS — M48.07 SPINAL STENOSIS, LUMBOSACRAL REGION: ICD-10-CM

## 2024-10-09 DIAGNOSIS — Z79.4 LONG TERM (CURRENT) USE OF INSULIN: ICD-10-CM

## 2024-10-09 DIAGNOSIS — Z86.718 PERSONAL HISTORY OF OTHER VENOUS THROMBOSIS AND EMBOLISM: ICD-10-CM

## 2024-10-09 DIAGNOSIS — Z79.01 LONG TERM (CURRENT) USE OF ANTICOAGULANTS: ICD-10-CM

## 2024-10-09 DIAGNOSIS — K75.81 NONALCOHOLIC STEATOHEPATITIS (NASH): ICD-10-CM

## 2024-10-09 DIAGNOSIS — E11.40 TYPE 2 DIABETES MELLITUS WITH DIABETIC NEUROPATHY, UNSPECIFIED: ICD-10-CM

## 2024-10-09 DIAGNOSIS — E11.22 TYPE 2 DIABETES MELLITUS WITH DIABETIC CHRONIC KIDNEY DISEASE: ICD-10-CM

## 2024-10-09 DIAGNOSIS — G47.33 OBSTRUCTIVE SLEEP APNEA (ADULT) (PEDIATRIC): ICD-10-CM

## 2024-10-09 DIAGNOSIS — Z96.643 PRESENCE OF ARTIFICIAL HIP JOINT, BILATERAL: ICD-10-CM

## 2024-10-22 ENCOUNTER — APPOINTMENT (OUTPATIENT)
Dept: NEUROSURGERY | Facility: CLINIC | Age: 59
End: 2024-10-22
Payer: MEDICARE

## 2024-10-22 VITALS — HEIGHT: 67 IN | BODY MASS INDEX: 36.1 KG/M2 | WEIGHT: 230 LBS

## 2024-10-22 DIAGNOSIS — Z09 ENCOUNTER FOR FOLLOW-UP EXAMINATION AFTER COMPLETED TREATMENT FOR CONDITIONS OTHER THAN MALIGNANT NEOPLASM: ICD-10-CM

## 2024-10-22 PROCEDURE — 99024 POSTOP FOLLOW-UP VISIT: CPT

## 2024-11-19 ENCOUNTER — NON-APPOINTMENT (OUTPATIENT)
Age: 59
End: 2024-11-19

## 2024-11-19 ENCOUNTER — APPOINTMENT (OUTPATIENT)
Dept: NEUROSURGERY | Facility: CLINIC | Age: 59
End: 2024-11-19

## 2024-11-19 VITALS — BODY MASS INDEX: 36.1 KG/M2 | WEIGHT: 230 LBS | HEIGHT: 67 IN

## 2024-11-19 DIAGNOSIS — D33.4 BENIGN NEOPLASM OF SPINAL CORD: ICD-10-CM

## 2024-11-19 PROCEDURE — 99024 POSTOP FOLLOW-UP VISIT: CPT

## 2025-01-06 ENCOUNTER — RESULT REVIEW (OUTPATIENT)
Age: 60
End: 2025-01-06

## 2025-01-06 ENCOUNTER — OUTPATIENT (OUTPATIENT)
Dept: OUTPATIENT SERVICES | Facility: HOSPITAL | Age: 60
LOS: 1 days | End: 2025-01-06
Payer: MEDICARE

## 2025-01-06 DIAGNOSIS — Z98.890 OTHER SPECIFIED POSTPROCEDURAL STATES: Chronic | ICD-10-CM

## 2025-01-06 DIAGNOSIS — Z87.19 PERSONAL HISTORY OF OTHER DISEASES OF THE DIGESTIVE SYSTEM: Chronic | ICD-10-CM

## 2025-01-06 DIAGNOSIS — Z96.642 PRESENCE OF LEFT ARTIFICIAL HIP JOINT: Chronic | ICD-10-CM

## 2025-01-06 DIAGNOSIS — Z90.49 ACQUIRED ABSENCE OF OTHER SPECIFIED PARTS OF DIGESTIVE TRACT: Chronic | ICD-10-CM

## 2025-01-06 DIAGNOSIS — D33.4 BENIGN NEOPLASM OF SPINAL CORD: ICD-10-CM

## 2025-01-06 DIAGNOSIS — Z98.891 HISTORY OF UTERINE SCAR FROM PREVIOUS SURGERY: Chronic | ICD-10-CM

## 2025-01-06 DIAGNOSIS — Z00.8 ENCOUNTER FOR OTHER GENERAL EXAMINATION: ICD-10-CM

## 2025-01-06 DIAGNOSIS — Z96.641 PRESENCE OF RIGHT ARTIFICIAL HIP JOINT: Chronic | ICD-10-CM

## 2025-01-06 PROCEDURE — A9579: CPT

## 2025-01-06 PROCEDURE — 72158 MRI LUMBAR SPINE W/O & W/DYE: CPT | Mod: 26

## 2025-01-06 PROCEDURE — 72158 MRI LUMBAR SPINE W/O & W/DYE: CPT

## 2025-01-07 DIAGNOSIS — D33.4 BENIGN NEOPLASM OF SPINAL CORD: ICD-10-CM

## 2025-02-06 ENCOUNTER — APPOINTMENT (OUTPATIENT)
Dept: NEUROSURGERY | Facility: CLINIC | Age: 60
End: 2025-02-06

## 2025-02-06 DIAGNOSIS — D33.4 BENIGN NEOPLASM OF SPINAL CORD: ICD-10-CM

## 2025-02-06 PROCEDURE — 99213 OFFICE O/P EST LOW 20 MIN: CPT | Mod: 93

## 2025-06-02 ENCOUNTER — APPOINTMENT (OUTPATIENT)
Dept: ORTHOPEDIC SURGERY | Facility: CLINIC | Age: 60
End: 2025-06-02
Payer: MEDICARE

## 2025-06-02 VITALS — BODY MASS INDEX: 36.41 KG/M2 | HEIGHT: 67 IN | WEIGHT: 232 LBS

## 2025-06-02 DIAGNOSIS — M25.552 PAIN IN LEFT HIP: ICD-10-CM

## 2025-06-02 PROCEDURE — 73521 X-RAY EXAM HIPS BI 2 VIEWS: CPT

## 2025-06-02 PROCEDURE — 20610 DRAIN/INJ JOINT/BURSA W/O US: CPT | Mod: LT

## 2025-06-02 RX ORDER — METHYLPREDNISOLONE 4 MG/1
4 TABLET ORAL
Qty: 1 | Refills: 1 | Status: ACTIVE | COMMUNITY
Start: 2025-06-02 | End: 1900-01-01

## 2025-07-21 ENCOUNTER — APPOINTMENT (OUTPATIENT)
Dept: COLORECTAL SURGERY | Facility: CLINIC | Age: 60
End: 2025-07-21

## 2025-07-23 DIAGNOSIS — M54.16 RADICULOPATHY, LUMBAR REGION: ICD-10-CM

## 2025-08-06 ENCOUNTER — NON-APPOINTMENT (OUTPATIENT)
Age: 60
End: 2025-08-06

## 2025-08-07 ENCOUNTER — APPOINTMENT (OUTPATIENT)
Dept: ORTHOPEDIC SURGERY | Facility: CLINIC | Age: 60
End: 2025-08-07

## 2025-08-07 ENCOUNTER — NON-APPOINTMENT (OUTPATIENT)
Age: 60
End: 2025-08-07

## 2025-08-07 PROCEDURE — 99214 OFFICE O/P EST MOD 30 MIN: CPT

## 2025-08-19 ENCOUNTER — APPOINTMENT (OUTPATIENT)
Dept: NEUROSURGERY | Facility: CLINIC | Age: 60
End: 2025-08-19

## 2025-08-19 ENCOUNTER — NON-APPOINTMENT (OUTPATIENT)
Age: 60
End: 2025-08-19

## 2025-08-19 VITALS — HEIGHT: 67 IN | WEIGHT: 228 LBS | BODY MASS INDEX: 35.79 KG/M2

## 2025-08-19 DIAGNOSIS — D33.4 BENIGN NEOPLASM OF SPINAL CORD: ICD-10-CM

## 2025-08-19 PROCEDURE — 99213 OFFICE O/P EST LOW 20 MIN: CPT

## 2025-08-21 DIAGNOSIS — Z96.649 MECHANICAL LOOSENING OF OTHER INTERNAL PROSTHETIC JOINT, INITIAL ENCOUNTER: ICD-10-CM

## 2025-08-21 DIAGNOSIS — T84.038A MECHANICAL LOOSENING OF OTHER INTERNAL PROSTHETIC JOINT, INITIAL ENCOUNTER: ICD-10-CM

## 2025-09-15 ENCOUNTER — NON-APPOINTMENT (OUTPATIENT)
Age: 60
End: 2025-09-15

## 2025-09-15 ENCOUNTER — APPOINTMENT (OUTPATIENT)
Dept: COLORECTAL SURGERY | Facility: CLINIC | Age: 60
End: 2025-09-15
Payer: MEDICARE

## 2025-09-15 VITALS
WEIGHT: 229 LBS | HEART RATE: 61 BPM | BODY MASS INDEX: 35.94 KG/M2 | HEIGHT: 67 IN | TEMPERATURE: 98.1 F | RESPIRATION RATE: 16 BRPM | DIASTOLIC BLOOD PRESSURE: 73 MMHG | SYSTOLIC BLOOD PRESSURE: 119 MMHG | OXYGEN SATURATION: 96 %

## 2025-09-15 PROCEDURE — 46600 DIAGNOSTIC ANOSCOPY SPX: CPT
